# Patient Record
Sex: MALE | Race: WHITE | NOT HISPANIC OR LATINO | URBAN - METROPOLITAN AREA
[De-identification: names, ages, dates, MRNs, and addresses within clinical notes are randomized per-mention and may not be internally consistent; named-entity substitution may affect disease eponyms.]

---

## 2022-06-15 ENCOUNTER — EMERGENCY (EMERGENCY)
Facility: HOSPITAL | Age: 72
LOS: 1 days | Discharge: HOME | End: 2022-06-15
Attending: EMERGENCY MEDICINE | Admitting: EMERGENCY MEDICINE
Payer: MEDICARE

## 2022-06-15 VITALS
OXYGEN SATURATION: 97 % | HEIGHT: 72 IN | WEIGHT: 220.02 LBS | DIASTOLIC BLOOD PRESSURE: 83 MMHG | SYSTOLIC BLOOD PRESSURE: 125 MMHG | HEART RATE: 91 BPM | RESPIRATION RATE: 18 BRPM | TEMPERATURE: 97 F

## 2022-06-15 VITALS
OXYGEN SATURATION: 98 % | RESPIRATION RATE: 16 BRPM | SYSTOLIC BLOOD PRESSURE: 124 MMHG | TEMPERATURE: 98 F | HEART RATE: 88 BPM | DIASTOLIC BLOOD PRESSURE: 74 MMHG

## 2022-06-15 DIAGNOSIS — Z98.89 OTHER SPECIFIED POSTPROCEDURAL STATES: Chronic | ICD-10-CM

## 2022-06-15 PROCEDURE — 99284 EMERGENCY DEPT VISIT MOD MDM: CPT | Mod: FS

## 2022-06-15 PROCEDURE — 73610 X-RAY EXAM OF ANKLE: CPT | Mod: 26,RT

## 2022-06-15 PROCEDURE — 99284 EMERGENCY DEPT VISIT MOD MDM: CPT | Mod: 25

## 2022-06-15 PROCEDURE — 73610 X-RAY EXAM OF ANKLE: CPT

## 2022-06-15 PROCEDURE — 73590 X-RAY EXAM OF LOWER LEG: CPT

## 2022-06-15 PROCEDURE — 73590 X-RAY EXAM OF LOWER LEG: CPT | Mod: 26,RT

## 2022-06-15 NOTE — ED PROVIDER NOTE - PATIENT PORTAL LINK FT
You can access the FollowMyHealth Patient Portal offered by [facility] by registering at the following website: [email or web address]. By joining [facility]’s FollowMyHealth portal, you will also be able to view your health information using other applications (apps) compatible with our system.

## 2022-06-15 NOTE — ED PROVIDER NOTE - NSFOLLOWUPINSTRUCTIONS_ED_ALL_ED_FT
Fracture    A fracture is a break in one of your bones. This can occur from a variety of injuries, especially traumatic ones. Symptoms include pain, bruising, or swelling. Do not use the injured limb. If a fracture is in one of the bones below your waist, do not put weight on that limb unless instructed to do so by your healthcare provider. Crutches or a cane may have been provided. A splint or cast may have been applied by your health care provider. Make sure to keep it dry and follow up with an orthopedist as instructed.    SEEK IMMEDIATE MEDICAL CARE IF YOU HAVE ANY OF THE FOLLOWING SYMPTOMS: numbness, tingling, increasing pain, or weakness in any part of the injured limb.      1. TAKE ALL MEDICATIONS AS DIRECTED. REST APPLY ICE AS NEEDED. ELEVATE EXTREMITY.   FOR PAIN YOU CAN TAKE IBUPROFEN (MOTRIN, ADVIL) OR ACETAMINOPHEN (TYLENOL) AS NEEDED, AS DIRECTED ON PACKAGING.  2. FOLLOW UP WITH __________ AS DIRECTED.  YOU WERE GIVEN COPIES OF ALL LABS AND IMAGING RESULTS FROM YOUR ER VISIT--PLEASE TAKE THEM WITH YOU TO YOUR APPOINTMENT.  3. IF NEEDED, CALL [phone] TO FIND A PRIMARY CARE PHYSICIAN.  OR CALL [phone] TO MAKE AN APPOINTMENT WITH THE MEDICAL CLINIC.  4. RETURN TO THE ER FOR ANY WORSENING SYMPTOMS.

## 2022-06-15 NOTE — ED ADULT NURSE NOTE - NSIMPLEMENTINTERV_GEN_ALL_ED
Implemented All Fall Risk Interventions:  [place] to call system. Call bell, personal items and telephone within reach. Instruct patient to call for assistance. Room bathroom lighting operational. Non-slip footwear when patient is off stretcher. Physically safe environment: no spills, clutter or unnecessary equipment. Stretcher in lowest position, wheels locked, appropriate side rails in place. Provide visual cue, wrist band, yellow gown, etc. Monitor gait and stability. Monitor for mental status changes and reorient to person, place, and time. Review medications for side effects contributing to fall risk. Reinforce activity limits and safety measures with patient and family.

## 2022-06-15 NOTE — ED PROVIDER NOTE - CARE PROVIDER_API CALL
Logan Caal  ORTHOPAEDIC SURGERY  [address] [place] [address]  Phone: [phone]  Fax: [phone]  Follow Up Time: 1-3 Days

## 2022-06-15 NOTE — ED PROVIDER NOTE - NSICDXPASTSURGICALHX_GEN_ALL_CORE_FT
PAST SURGICAL HISTORY:  S/P arthroscopic knee surgery     S/P ORIF (open reduction internal fixation) fracture

## 2022-06-15 NOTE — ED PROVIDER NOTE - CLINICAL SUMMARY MEDICAL DECISION MAKING FREE TEXT BOX
71 yo white male with twisting injury to right ankle earlier today and here now c/o pain and swelling right ankle. This case will require evaluation as well as imaging.

## 2022-06-15 NOTE — ED PROVIDER NOTE - PHYSICAL EXAMINATION
PE:   GEN: Awake, alert, interactive, NAD, non-toxic appearing.   HEAD: Atraumatic  NEURO: AOx3, CN II-XII grossly intact without focal deficit.   MSK: R ankle with swelling to medial aspect. TTP medial malleolus, nontender foot and toes, nontender tib/fib   SKIN: Warm, dry, normal color, without apparent rashes., superficial abrasion to medial R ankle

## 2022-06-15 NOTE — ED PROVIDER NOTE - NS ED ATTENDING STATEMENT MOD
This was a shared visit with the MARILIN. I reviewed and verified the documentation and independently performed the documented:

## 2022-06-15 NOTE — ED PROVIDER NOTE - ATTENDING APP SHARED VISIT CONTRIBUTION OF CARE
Exam revealed white male in NAD with mild tenderness to palpation bi-malleolar on RLE with intact N/V RLE. I agree with plan and management outlined by PA.

## 2022-06-15 NOTE — ED PROVIDER NOTE - OBJECTIVE STATEMENT
73 y/o M with PMH HTN presents to ED for c/o R ankle pain s/p trip and fall today. states was working at a [employer] house going down steep stairs when he lost his footing and fell. Did not hit head of lose consciousness. Reports R ankle pain. Has been walking but with a lot of pain. Denies neck or back pain or any other injury.

## 2022-10-01 ENCOUNTER — INPATIENT (INPATIENT)
Facility: HOSPITAL | Age: 72
LOS: 1 days | Discharge: HOME | DRG: 378 | End: 2022-10-03
Attending: HOSPITALIST | Admitting: STUDENT IN AN ORGANIZED HEALTH CARE EDUCATION/TRAINING PROGRAM
Payer: COMMERCIAL

## 2022-10-01 VITALS
TEMPERATURE: 98 F | RESPIRATION RATE: 20 BRPM | WEIGHT: 225.09 LBS | SYSTOLIC BLOOD PRESSURE: 91 MMHG | HEIGHT: 71 IN | HEART RATE: 102 BPM | OXYGEN SATURATION: 98 % | DIASTOLIC BLOOD PRESSURE: 59 MMHG

## 2022-10-01 DIAGNOSIS — Z98.89 OTHER SPECIFIED POSTPROCEDURAL STATES: Chronic | ICD-10-CM

## 2022-10-01 LAB
APTT BLD: 24.5 SEC — LOW (ref 27.5–35.5)
BASOPHILS # BLD AUTO: 0.01 K/UL — SIGNIFICANT CHANGE UP (ref 0–0.2)
BASOPHILS NFR BLD AUTO: 0.1 % — SIGNIFICANT CHANGE UP (ref 0–2)
EOSINOPHIL # BLD AUTO: 0.05 K/UL — SIGNIFICANT CHANGE UP (ref 0–0.5)
EOSINOPHIL NFR BLD AUTO: 0.6 % — SIGNIFICANT CHANGE UP (ref 0–6)
HCT VFR BLD CALC: 31.9 % — LOW (ref 39–50)
HGB BLD-MCNC: 10.5 G/DL — LOW (ref 13–17)
IMM GRANULOCYTES NFR BLD AUTO: 0.4 % — SIGNIFICANT CHANGE UP (ref 0–0.9)
INR BLD: 1.15 RATIO — SIGNIFICANT CHANGE UP (ref 0.88–1.16)
LIDOCAIN IGE QN: 94 U/L — SIGNIFICANT CHANGE UP (ref 73–393)
LYMPHOCYTES # BLD AUTO: 2.87 K/UL — SIGNIFICANT CHANGE UP (ref 1–3.3)
LYMPHOCYTES # BLD AUTO: 31.7 % — SIGNIFICANT CHANGE UP (ref 13–44)
MCHC RBC-ENTMCNC: 31.5 PG — SIGNIFICANT CHANGE UP (ref 27–34)
MCHC RBC-ENTMCNC: 32.9 GM/DL — SIGNIFICANT CHANGE UP (ref 32–36)
MCV RBC AUTO: 95.8 FL — SIGNIFICANT CHANGE UP (ref 80–100)
MONOCYTES # BLD AUTO: 0.46 K/UL — SIGNIFICANT CHANGE UP (ref 0–0.9)
MONOCYTES NFR BLD AUTO: 5.1 % — SIGNIFICANT CHANGE UP (ref 2–14)
NEUTROPHILS # BLD AUTO: 5.61 K/UL — SIGNIFICANT CHANGE UP (ref 1.8–7.4)
NEUTROPHILS NFR BLD AUTO: 62.1 % — SIGNIFICANT CHANGE UP (ref 43–77)
NRBC # BLD: 0 /100 WBCS — SIGNIFICANT CHANGE UP (ref 0–0)
PLATELET # BLD AUTO: 195 K/UL — SIGNIFICANT CHANGE UP (ref 150–400)
PROTHROM AB SERPL-ACNC: 13.5 SEC — HIGH (ref 10.5–13.4)
RBC # BLD: 3.33 M/UL — LOW (ref 4.2–5.8)
RBC # FLD: 12.9 % — SIGNIFICANT CHANGE UP (ref 10.3–14.5)
WBC # BLD: 9.04 K/UL — SIGNIFICANT CHANGE UP (ref 3.8–10.5)
WBC # FLD AUTO: 9.04 K/UL — SIGNIFICANT CHANGE UP (ref 3.8–10.5)

## 2022-10-01 PROCEDURE — 99285 EMERGENCY DEPT VISIT HI MDM: CPT

## 2022-10-01 PROCEDURE — 93010 ELECTROCARDIOGRAM REPORT: CPT

## 2022-10-01 RX ORDER — ONDANSETRON 8 MG/1
4 TABLET, FILM COATED ORAL ONCE
Refills: 0 | Status: COMPLETED | OUTPATIENT
Start: 2022-10-01 | End: 2022-10-01

## 2022-10-01 RX ORDER — PANTOPRAZOLE SODIUM 20 MG/1
40 TABLET, DELAYED RELEASE ORAL ONCE
Refills: 0 | Status: COMPLETED | OUTPATIENT
Start: 2022-10-01 | End: 2022-10-01

## 2022-10-01 RX ORDER — SODIUM CHLORIDE 9 MG/ML
1000 INJECTION INTRAMUSCULAR; INTRAVENOUS; SUBCUTANEOUS ONCE
Refills: 0 | Status: COMPLETED | OUTPATIENT
Start: 2022-10-01 | End: 2022-10-01

## 2022-10-01 RX ADMIN — SODIUM CHLORIDE 1000 MILLILITER(S): 9 INJECTION INTRAMUSCULAR; INTRAVENOUS; SUBCUTANEOUS at 23:25

## 2022-10-01 RX ADMIN — PANTOPRAZOLE SODIUM 40 MILLIGRAM(S): 20 TABLET, DELAYED RELEASE ORAL at 23:28

## 2022-10-01 RX ADMIN — ONDANSETRON 4 MILLIGRAM(S): 8 TABLET, FILM COATED ORAL at 23:28

## 2022-10-01 NOTE — ED PROVIDER NOTE - CONSTITUTIONAL, MLM
normal... Well appearing, awake, alert, oriented to person, place, time/situation and in mild apparent distress due to nausea.

## 2022-10-01 NOTE — ED ADULT TRIAGE NOTE - CCCP TRG CHIEF CMPLNT
Pt received semisupine in bed in NAD. + oxygen via nasal cannula. + PEG tube.  and private aides at bedside. Per RN Shalom, pt okay to participate in OT evaluation.
syncope at home/bloody vomitus

## 2022-10-01 NOTE — ED ADULT NURSE NOTE - OBJECTIVE STATEMENT
pt AOx4 stating he was at home when he was walking after going to the bathroom and passed out. pt states he is nauseous and has been vomiting blood. pt denies abdominal pain. pt nauseous at this time not actively vomiting. abdomen is soft, nontender. peripheral IV inserted to R AC #18, IV patent. pt tolerated well. pt medicated as ordered. safety measures initiated. call bell within reach. will continue to monitor. RASHI RN

## 2022-10-01 NOTE — ED PROVIDER NOTE - OBJECTIVE STATEMENT
72-year-old male with a history of HTN, high cholesterol presents with reportedly went to a diner last night and had a hamburger.  Patient reportedly was not feeling well, with slight stomach upset that evening afterwards.  This morning patient was continuing to feel some slight stomach upset, although not having any acute abdominal pain.  Patient with some nausea throughout the day, started vomiting around 1 hour prior to arrival.  Patient with dark vomitus and bloody vomitus per family.  No fevers or chills.  No lower abdominal pain.  No neck or back pain.  No cough/URI.  Patient fully vaccinated for COVID.  No recent exposures.  No anticoagulation use.  No prior episodes of same.  No known sick contacts.  No other acute complaints. 72-year-old male with a history of HTN, high cholesterol presents with reportedly went to a diner last night and had a hamburger.  Patient reportedly was not feeling well, with slight stomach upset that evening afterwards.  This morning patient was continuing to feel some slight stomach upset, although not having any acute abdominal pain.  Patient with some nausea throughout the day, started vomiting around 1 hour prior to arrival.  Patient with dark vomitus and bloody vomitus per family. Around 30 minutes prior to arrival, patient became lightheaded and fell to the ground with reported short LOC. Patient denies injury from the fall, possibly hit head. No fevers or chills.  No lower abdominal pain.  No neck or back pain.  No cough/URI.  Patient fully vaccinated for COVID.  No recent exposures.  No anticoagulation use.  No prior episodes of same.  No known sick contacts.  No other acute complaints.

## 2022-10-01 NOTE — ED PROVIDER NOTE - ENMT, MLM
Airway patent, Nasal mucosa clear. Mouth with normal mucosa. Throat has no vesicles, no oropharyngeal exudates and uvula is midline. neck supple. no meningeal signs,.

## 2022-10-01 NOTE — ED PROVIDER NOTE - CLINICAL SUMMARY MEDICAL DECISION MAKING FREE TEXT BOX
Nausea and vomiting this evening, associated with dark vomitus and bloody vomitus with no history of anticoagulant use.  Questionable food poisoning from dinner he ate last night.  Check labs, meds, GI, admission.

## 2022-10-02 ENCOUNTER — TRANSCRIPTION ENCOUNTER (OUTPATIENT)
Age: 72
End: 2022-10-02

## 2022-10-02 DIAGNOSIS — E78.5 HYPERLIPIDEMIA, UNSPECIFIED: ICD-10-CM

## 2022-10-02 DIAGNOSIS — K92.2 GASTROINTESTINAL HEMORRHAGE, UNSPECIFIED: ICD-10-CM

## 2022-10-02 DIAGNOSIS — K59.00 CONSTIPATION, UNSPECIFIED: ICD-10-CM

## 2022-10-02 DIAGNOSIS — K92.0 HEMATEMESIS: ICD-10-CM

## 2022-10-02 DIAGNOSIS — Z29.9 ENCOUNTER FOR PROPHYLACTIC MEASURES, UNSPECIFIED: ICD-10-CM

## 2022-10-02 DIAGNOSIS — R55 SYNCOPE AND COLLAPSE: ICD-10-CM

## 2022-10-02 LAB
ALBUMIN SERPL ELPH-MCNC: 3.5 G/DL — SIGNIFICANT CHANGE UP (ref 3.3–5)
ALP SERPL-CCNC: 75 U/L — SIGNIFICANT CHANGE UP (ref 40–120)
ALT FLD-CCNC: 22 U/L — SIGNIFICANT CHANGE UP (ref 12–78)
ANION GAP SERPL CALC-SCNC: 8 MMOL/L — SIGNIFICANT CHANGE UP (ref 5–17)
AST SERPL-CCNC: 17 U/L — SIGNIFICANT CHANGE UP (ref 15–37)
BILIRUB SERPL-MCNC: 0.8 MG/DL — SIGNIFICANT CHANGE UP (ref 0.2–1.2)
BLD GP AB SCN SERPL QL: SIGNIFICANT CHANGE UP
BUN SERPL-MCNC: 50 MG/DL — HIGH (ref 7–23)
CALCIUM SERPL-MCNC: 8.1 MG/DL — LOW (ref 8.5–10.1)
CHLORIDE SERPL-SCNC: 108 MMOL/L — SIGNIFICANT CHANGE UP (ref 96–108)
CO2 SERPL-SCNC: 25 MMOL/L — SIGNIFICANT CHANGE UP (ref 22–31)
CREAT SERPL-MCNC: 0.98 MG/DL — SIGNIFICANT CHANGE UP (ref 0.5–1.3)
EGFR: 82 ML/MIN/1.73M2 — SIGNIFICANT CHANGE UP
GLUCOSE SERPL-MCNC: 187 MG/DL — HIGH (ref 70–99)
HCT VFR BLD CALC: 27 % — LOW (ref 39–50)
HGB BLD-MCNC: 9.1 G/DL — LOW (ref 13–17)
LACTATE SERPL-SCNC: 1.9 MMOL/L — SIGNIFICANT CHANGE UP (ref 0.7–2)
LACTATE SERPL-SCNC: 2.4 MMOL/L — HIGH (ref 0.7–2)
MCHC RBC-ENTMCNC: 32.4 PG — SIGNIFICANT CHANGE UP (ref 27–34)
MCHC RBC-ENTMCNC: 33.7 GM/DL — SIGNIFICANT CHANGE UP (ref 32–36)
MCV RBC AUTO: 96.1 FL — SIGNIFICANT CHANGE UP (ref 80–100)
NRBC # BLD: 0 /100 WBCS — SIGNIFICANT CHANGE UP (ref 0–0)
PLATELET # BLD AUTO: 153 K/UL — SIGNIFICANT CHANGE UP (ref 150–400)
POTASSIUM SERPL-MCNC: 3.8 MMOL/L — SIGNIFICANT CHANGE UP (ref 3.5–5.3)
POTASSIUM SERPL-SCNC: 3.8 MMOL/L — SIGNIFICANT CHANGE UP (ref 3.5–5.3)
PROT SERPL-MCNC: 6.9 G/DL — SIGNIFICANT CHANGE UP (ref 6–8.3)
RBC # BLD: 2.81 M/UL — LOW (ref 4.2–5.8)
RBC # FLD: 13.1 % — SIGNIFICANT CHANGE UP (ref 10.3–14.5)
SARS-COV-2 RNA SPEC QL NAA+PROBE: SIGNIFICANT CHANGE UP
SODIUM SERPL-SCNC: 141 MMOL/L — SIGNIFICANT CHANGE UP (ref 135–145)
TROPONIN I, HIGH SENSITIVITY RESULT: 44.3 NG/L — SIGNIFICANT CHANGE UP
WBC # BLD: 6.51 K/UL — SIGNIFICANT CHANGE UP (ref 3.8–10.5)
WBC # FLD AUTO: 6.51 K/UL — SIGNIFICANT CHANGE UP (ref 3.8–10.5)

## 2022-10-02 PROCEDURE — 99223 1ST HOSP IP/OBS HIGH 75: CPT

## 2022-10-02 PROCEDURE — 74174 CTA ABD&PLVS W/CONTRAST: CPT | Mod: 26,MA

## 2022-10-02 PROCEDURE — 70450 CT HEAD/BRAIN W/O DYE: CPT | Mod: 26,MA

## 2022-10-02 PROCEDURE — 71045 X-RAY EXAM CHEST 1 VIEW: CPT | Mod: 26

## 2022-10-02 RX ORDER — SENNA PLUS 8.6 MG/1
1 TABLET ORAL DAILY
Refills: 0 | Status: DISCONTINUED | OUTPATIENT
Start: 2022-10-02 | End: 2022-10-03

## 2022-10-02 RX ORDER — SODIUM CHLORIDE 9 MG/ML
1000 INJECTION INTRAMUSCULAR; INTRAVENOUS; SUBCUTANEOUS
Refills: 0 | Status: DISCONTINUED | OUTPATIENT
Start: 2022-10-02 | End: 2022-10-03

## 2022-10-02 RX ORDER — ACETAMINOPHEN 500 MG
650 TABLET ORAL EVERY 6 HOURS
Refills: 0 | Status: DISCONTINUED | OUTPATIENT
Start: 2022-10-02 | End: 2022-10-03

## 2022-10-02 RX ORDER — LANOLIN ALCOHOL/MO/W.PET/CERES
3 CREAM (GRAM) TOPICAL AT BEDTIME
Refills: 0 | Status: DISCONTINUED | OUTPATIENT
Start: 2022-10-02 | End: 2022-10-03

## 2022-10-02 RX ORDER — INFLUENZA VIRUS VACCINE 15; 15; 15; 15 UG/.5ML; UG/.5ML; UG/.5ML; UG/.5ML
0.7 SUSPENSION INTRAMUSCULAR ONCE
Refills: 0 | Status: DISCONTINUED | OUTPATIENT
Start: 2022-10-02 | End: 2022-10-03

## 2022-10-02 RX ORDER — POLYETHYLENE GLYCOL 3350 17 G/17G
17 POWDER, FOR SOLUTION ORAL DAILY
Refills: 0 | Status: DISCONTINUED | OUTPATIENT
Start: 2022-10-02 | End: 2022-10-03

## 2022-10-02 RX ORDER — SIMVASTATIN 20 MG/1
20 TABLET, FILM COATED ORAL AT BEDTIME
Refills: 0 | Status: DISCONTINUED | OUTPATIENT
Start: 2022-10-02 | End: 2022-10-03

## 2022-10-02 RX ORDER — PANTOPRAZOLE SODIUM 20 MG/1
40 TABLET, DELAYED RELEASE ORAL DAILY
Refills: 0 | Status: DISCONTINUED | OUTPATIENT
Start: 2022-10-02 | End: 2022-10-03

## 2022-10-02 RX ORDER — EZETIMIBE AND SIMVASTATIN 10; 80 MG/1; MG/1
1 TABLET, FILM COATED ORAL
Qty: 0 | Refills: 0 | DISCHARGE

## 2022-10-02 RX ORDER — ONDANSETRON 8 MG/1
4 TABLET, FILM COATED ORAL EVERY 8 HOURS
Refills: 0 | Status: DISCONTINUED | OUTPATIENT
Start: 2022-10-02 | End: 2022-10-03

## 2022-10-02 RX ORDER — SUCRALFATE 1 G
1 TABLET ORAL
Refills: 0 | Status: DISCONTINUED | OUTPATIENT
Start: 2022-10-02 | End: 2022-10-03

## 2022-10-02 RX ADMIN — Medication 1 GRAM(S): at 18:16

## 2022-10-02 RX ADMIN — PANTOPRAZOLE SODIUM 40 MILLIGRAM(S): 20 TABLET, DELAYED RELEASE ORAL at 12:07

## 2022-10-02 RX ADMIN — SIMVASTATIN 20 MILLIGRAM(S): 20 TABLET, FILM COATED ORAL at 23:08

## 2022-10-02 RX ADMIN — SODIUM CHLORIDE 84 MILLILITER(S): 9 INJECTION INTRAMUSCULAR; INTRAVENOUS; SUBCUTANEOUS at 05:04

## 2022-10-02 RX ADMIN — SODIUM CHLORIDE 1000 MILLILITER(S): 9 INJECTION INTRAMUSCULAR; INTRAVENOUS; SUBCUTANEOUS at 00:55

## 2022-10-02 NOTE — PATIENT PROFILE ADULT - NSTOBACCONEVERSMOKERY/N_GEN_A
Griseofulvin Pregnancy And Lactation Text: This medication is Pregnancy Category X and is known to cause serious birth defects. It is unknown if this medication is excreted in breast milk but breast feeding should be avoided. No

## 2022-10-02 NOTE — H&P ADULT - NSICDXPASTMEDICALHX_GEN_ALL_CORE_FT
PAST MEDICAL HISTORY:  HLD (hyperlipidemia)     Knee torn cartilage, left     Mumps     Right fibular fracture     Scarlet fever

## 2022-10-02 NOTE — H&P ADULT - HISTORY OF PRESENT ILLNESS
Patient is a 71 y/o M with PMHx of HLD, anxiety, and constipation who presents to the ED with nausea, abdominal pain, and hematemesis. Patient was in normal state of health until 2 days ago when he began having abdominal pain and nausea after an omelet he ate at a diner. No one else who ate at the restaurant is experiencing similar symptoms. The symptoms persisted into the following morning, but patient also noticed weakness and disorientation. He reports falling from a standing position twice yesterday. The first episode occurred on his way to the bathroom. He reports losing his balance, but denies dizziness, warmth, or loss of consciousness prior to the episode. He was able to return to his feet shortly afterwards. Later in the same day, patient lost consciousness after several episodes of hematemesis in the bathroom while standing. He was awakened by his wife's home aide. He has little recollection of the second event, but denies pre-syncopal symptoms or an aura prior to losing consciousness. The home aide did not report seeing seizure-like activity. Patient had 2 more episodes of hematemesis in the car ride to the hospital and in the ED. This has never happened before. Patient reports increased stress over the past 2 months, following his wife's stroke, and reports increased abdominal distress when he becomes emotional. On several occasions in the past 2 years, patient has had "anxiety attacks" described as tearfulness. Currently denies CP, SOB, abd pain, headache, leg pain, leg weakness.    ED Course  Vitals: /70, HR 94, RR 16, SpO2 100% RA, T 98.3  Labs: Hgb 10.5, Hct 31.9, Lactate 2.4 --> 1.9, BUN 50, Glu 187  Imaging: CT H- no acute infarctions, CT A/P  - No colitis. No evidence of gastrointestinal bleeding. Diverticulosis w/o bleed  ECG: NSR    Given NS bolus, ondansetron, protonix

## 2022-10-02 NOTE — H&P ADULT - ATTENDING COMMENTS
72M PMHx of HLD, anxiety, and constipation who presents to the ED with nausea, abdominal pain, and hematemesis. Will keep pt NPO for now, will start Protonix, Zofran PRN for N/V, pt has not had any further hematemesis episodes. Will consult GI to determine if a scope is warranted at this time. Advanced diet as tolerated aleena.

## 2022-10-02 NOTE — H&P ADULT - NSHPPHYSICALEXAM_GEN_ALL_CORE
T(C): 36.8 (10-01-22 @ 22:56), Max: 36.8 (10-01-22 @ 22:56)  HR: 94 (10-01-22 @ 23:36) (94 - 102)  BP: 106/70 (10-01-22 @ 23:36) (91/59 - 106/70)  RR: 16 (10-01-22 @ 23:36) (16 - 20)  SpO2: 100% (10-01-22 @ 23:36) (98% - 100%)    GENERAL: patient appears well, no acute distress, appropriate, pleasant  EYES: sclera clear, no exudates  ENMT: oropharynx clear without erythema, no exudates, moist mucous membranes  NECK: supple, soft, no thyromegaly noted  LUNGS: good air entry bilaterally, clear to auscultation, symmetric breath sounds, no wheezing or rhonchi appreciated  HEART: soft S1/S2, regular rate and rhythm, no murmurs noted, no lower extremity edema  GASTROINTESTINAL: abdomen is soft, nontender, nondistended, normoactive bowel sounds, no palpable masses  INTEGUMENT: good skin turgor, no lesions noted  MUSCULOSKELETAL: no clubbing or cyanosis, no obvious deformity  NEUROLOGIC: awake, alert, oriented x3, good muscle tone in 4 extremities, no obvious sensory deficits  PSYCHIATRIC: +mildly anxious, affect is congruent, linear and logical thought process  HEME/LYMPH: no palpable supraclavicular nodules, no obvious ecchymosis or petechiae

## 2022-10-02 NOTE — H&P ADULT - PROBLEM SELECTOR PLAN 1
Patient presented with nausea, abd pain, hematemesis, and syncope likely 2/2 to peptic ulcer disease    - Patient presented with nausea, abd pain, hematemesis, and syncope likely 2/2 to peptic ulcer disease    - Admit to general medicine floor   - CTA A/P: Diverticulosis descending and sigmoid portions the colon without acute diverticulitis. No colitis. No evidence of gastrointestinal bleeding   - Lactate 2.4 --> 1.9; BUN 50   - NPO except for meds   - Protonix, Zofran    - IVF NS    - f/u AM CBC   - T+S completed    - Transfuse as necessary for Hgb <7   - GI (Dr. Valencia) consulted, observe recs Patient presented with nausea, abd pain, hematemesis, and syncope likely 2/2 to peptic ulcer disease    - Admit to general medicine floor   - CTA A/P: Diverticulosis descending and sigmoid portions the colon without acute diverticulitis. No colitis. No evidence of gastrointestinal bleeding   - CXR: personal read, likely atelectasis in R lung. No signs of acute pathology. F/u formal read    - Lactate 2.4 --> 1.9; BUN 50   - NPO except for meds   - Protonix, Zofran    - IVF NS    - f/u AM CBC, CMP   - T+S completed    - Transfuse as necessary for Hgb <7   - GI (Dr. Valencia) consulted, observe recs

## 2022-10-02 NOTE — PHYSICAL THERAPY INITIAL EVALUATION ADULT - PERTINENT HX OF CURRENT PROBLEM, REHAB EVAL
Patient is a 71 y/o M with PMHx of HLD, anxiety, and constipation who presents to the ED with nausea, abdominal pain, and hematemesis. Patient was in normal state of health until 2 days ago when he began having abdominal pain and nausea after an omelet he ate at a diner. No one else who ate at the restaurant is experiencing similar symptoms. The symptoms persisted into the following morning, but patient also noticed weakness and disorientation. He reports falling from a standing position twice yesterday. The first episode occurred on his way to the bathroom. He reports losing his balance, but denies dizziness, warmth, or loss of consciousness prior to the episode. He was able to return to his feet shortly afterwards. Later in the same day, patient lost consciousness after several episodes of hematemesis in the bathroom while standing. He was awakened by his wife's home aide. Patient will be admitted for upper GI bleed.

## 2022-10-02 NOTE — PHYSICAL THERAPY INITIAL EVALUATION ADULT - ADDITIONAL COMMENTS
Pt reports prior to admission he was independent with ADL's and had even returned to the gym 3 weeks ago, swimming 5x/week. Pt lives in split level home with stairs to enter.

## 2022-10-02 NOTE — H&P ADULT - NSHPREVIEWOFSYSTEMS_GEN_ALL_CORE
CONSTITUTIONAL: denies fever, chills, fatigue, weakness  HEENT: denies blurred vision, sore throat  SKIN: denies new lesions, rash  CARDIOVASCULAR: denies chest pain, chest pressure, palpitations  RESPIRATORY: denies shortness of breath, sputum production  GASTROINTESTINAL: + nausea, +constipation, denies vomiting, diarrhea, abdominal pain  GENITOURINARY: denies dysuria, discharge  NEUROLOGICAL: denies numbness, headache, focal weakness  MUSCULOSKELETAL: denies new joint pain, muscle aches  HEMATOLOGIC: denies gross bleeding, bruising  LYMPHATICS: denies enlarged lymph nodes, extremity swelling  PSYCHIATRIC: +anxiety, denies depression  ENDOCRINOLOGIC: denies sweating, cold or heat intolerance

## 2022-10-02 NOTE — PATIENT PROFILE ADULT - FALL HARM RISK - HARM RISK INTERVENTIONS

## 2022-10-02 NOTE — PATIENT PROFILE ADULT - VISION (WITH CORRECTIVE LENSES IF THE PATIENT USUALLY WEARS THEM):
Normal vision: sees adequately in most situations; can see medication labels, newsprint left glasses at home/Partially impaired: cannot see medication labels or newsprint, but can see obstacles in path, and the surrounding layout; can count fingers at arm's length

## 2022-10-02 NOTE — CONSULT NOTE ADULT - SUBJECTIVE AND OBJECTIVE BOX
Chief Complaint:  Patient is a 72y old  Male who presents with a chief complaint of Upper GI Bleed   Patient is a 73 y/o M with PMHx of HLD, anxiety, and constipation who presents to the ED with nausea, abdominal pain, and hematemesis. Patient was in normal state of health until 2 days ago when he began having abdominal pain and nausea after an omelet he ate at a diner. No one else who ate at the restaurant is experiencing similar symptoms. The symptoms persisted into the following morning, but patient also noticed weakness and disorientation. He reports falling from a standing position twice yesterday. The first episode occurred on his way to the bathroom. He reports losing his balance, but denies dizziness, warmth, or loss of consciousness prior to the episode. He was able to return to his feet shortly afterwards. Later in the same day, patient lost consciousness after several episodes of hematemesis in the bathroom while standing. He was awakened by his wife's home aide. He has little recollection of the second event, but denies pre-syncopal symptoms or an aura prior to losing consciousness. The home aide did not report seeing seizure-like activity. Patient had 2 more episodes of hematemesis in the car ride to the hospital and in the ED. This has never happened before. Patient reports increased stress over the past 2 months, following his wife's stroke, and reports increased abdominal distress when he becomes emotional. On several occasions in the past 2 years, patient has had "anxiety attacks" described as tearfulness. Currently denies CP, SOB, abd pain, headache, leg pain, leg weakness.    ED Course  Vitals: /70, HR 94, RR 16, SpO2 100% RA, T 98.3  Labs: Hgb 10.5, Hct 31.9, Lactate 2.4 --> 1.9, BUN 50, Glu 187  Imaging: CT H- no acute infarctions, CT A/P  - No colitis. No evidence of gastrointestinal bleeding. Diverticulosis w/o bleed  ECG: NSR    Given NS bolus, ondansetron, protonix      Review of Systems:  Review of Systems: CONSTITUTIONAL: denies fever, chills, fatigue, weakness  HEENT: denies blurred vision, sore throat  SKIN: denies new lesions, rash  CARDIOVASCULAR: denies chest pain, chest pressure, palpitations  RESPIRATORY: denies shortness of breath, sputum production  GASTROINTESTINAL: + nausea, +constipation, denies vomiting, diarrhea, abdominal pain  GENITOURINARY: denies dysuria, discharge  NEUROLOGICAL: denies numbness, headache, focal weakness  MUSCULOSKELETAL: denies new joint pain, muscle aches  HEMATOLOGIC: denies gross bleeding, bruising  LYMPHATICS: denies enlarged lymph nodes, extremity swelling  PSYCHIATRIC: +anxiety, denies depression  ENDOCRINOLOGIC: denies sweating, cold or heat intolerance    Allergies:  latex (Rash)  No Known Drug Allergies      Medications:  acetaminophen     Tablet .. 650 milliGRAM(s) Oral every 6 hours PRN  aluminum hydroxide/magnesium hydroxide/simethicone Suspension 30 milliLiter(s) Oral every 4 hours PRN  influenza  Vaccine (HIGH DOSE) 0.7 milliLiter(s) IntraMuscular once  melatonin 3 milliGRAM(s) Oral at bedtime PRN  ondansetron Injectable 4 milliGRAM(s) IV Push every 8 hours PRN  pantoprazole   Suspension 40 milliGRAM(s) Oral daily  polyethylene glycol 3350 17 Gram(s) Oral daily PRN  senna 1 Tablet(s) Oral daily PRN  simvastatin 20 milliGRAM(s) Oral at bedtime  sodium chloride 0.9%. 1000 milliLiter(s) IV Continuous <Continuous>  sucralfate 1 Gram(s) Oral two times a day      PMHX/PSHX:  Hypertension    Scarlet fever    Mumps    Knee torn cartilage, left    HLD (hyperlipidemia)    Right fibular fracture    S/P arthroscopic knee surgery    S/P ORIF (open reduction internal fixation) fracture        Family history:    no uc no cd    Social History:   no ivda no prbc  ROS:     General:  No wt loss, fevers, chills, night sweats, fatigue,   Eyes:  Good vision, no reported pain  ENT:  No sore throat, pain, runny nose, dysphagia  CV:  No pain, palpitations, hypo/hypertension  Resp:  No dyspnea, cough, tachypnea, wheezing  GI:  No pain, No nausea, No vomiting, No diarrhea, No constipation, No weight loss, No fever, No pruritis, No rectal bleeding, No tarry stools, No dysphagia,  :  No pain, bleeding, incontinence, nocturia  Muscle:  No pain, weakness  Neuro:  No weakness, tingling, memory problems  Psych:  No fatigue, insomnia, mood problems, depression  Endocrine:  No polyuria, polydipsia, cold/heat intolerance  Heme:  No petechiae, ecchymosis, easy bruisability  Skin:  No rash, tattoos, scars, edema      PHYSICAL EXAM:   Vital Signs:  Vital Signs Last 24 Hrs  T(C): 37.3 (02 Oct 2022 05:00), Max: 37.3 (02 Oct 2022 05:00)  T(F): 99.2 (02 Oct 2022 05:00), Max: 99.2 (02 Oct 2022 05:00)  HR: 101 (02 Oct 2022 05:00) (94 - 102)  BP: 946/- (02 Oct 2022 05:00) (91/59 - 946/-)  BP(mean): --  RR: 18 (02 Oct 2022 05:00) (15 - 20)  SpO2: 96% (02 Oct 2022 05:00) (96% - 100%)    Parameters below as of 02 Oct 2022 05:00  Patient On (Oxygen Delivery Method): room air      Daily Height in cm: 180.34 (01 Oct 2022 22:56)    Daily Weight in k.1 (02 Oct 2022 05:00)    GENERAL:  Appears stated age, well-groomed, well-nourished, no distress  HEENT:  NC/AT,  conjunctivae clear and pink, no thyromegaly, nodules, adenopathy, no JVD, sclera -anicteric  CHEST:  Full & symmetric excursion, no increased effort, breath sounds clear  HEART:  Regular rhythm, S1, S2, no murmur/rub/S3/S4, no abdominal bruit, no edema  ABDOMEN:  Soft, non-tender, non-distended, normoactive bowel sounds,  no masses ,no hepato-splenomegaly, no signs of chronic liver disease  EXTEREMITIES:  no cyanosis,clubbing or edema  SKIN:  No rash/erythema/ecchymoses/petechiae/wounds/abscess/warm/dry  NEURO:  Alert, oriented, no asterixis, no tremor, no encephalopathy    LABS:                        9.1    6.51  )-----------( 153      ( 02 Oct 2022 10:32 )             27.0     10-    141  |  108  |  50<H>  ----------------------------<  187<H>  3.8   |  25  |  0.98    Ca    8.1<L>      01 Oct 2022 23:35    TPro  6.9  /  Alb  3.5  /  TBili  0.8  /  DBili  x   /  AST  17  /  ALT  22  /  AlkPhos  75  10-01    LIVER FUNCTIONS - ( 01 Oct 2022 23:35 )  Alb: 3.5 g/dL / Pro: 6.9 g/dL / ALK PHOS: 75 U/L / ALT: 22 U/L / AST: 17 U/L / GGT: x           PT/INR - ( 01 Oct 2022 23:35 )   PT: 13.5 sec;   INR: 1.15 ratio         PTT - ( 01 Oct 2022 23:35 )  PTT:24.5 sec    Amylase Serum--      Lipase serum94       Ammonia--      Imaging:

## 2022-10-02 NOTE — PATIENT PROFILE ADULT - CONTRAINDICATIONS & PRECAUTIONS (SELECT ALL THAT APPLY)
Enbrel Counseling:  I discussed with the patient the risks of etanercept including but not limited to myelosuppression, immunosuppression, autoimmune hepatitis, demyelinating diseases, lymphoma, and infections.  The patient understands that monitoring is required including a PPD at baseline and must alert us or the primary physician if symptoms of infection or other concerning signs are noted. none...

## 2022-10-02 NOTE — H&P ADULT - PROBLEM SELECTOR PLAN 3
Irregular BMs, likely 2/2 to undiagnosed irritable bowel disease given assoc with increased emotions   - NPO, except for meds   - PRN senna and miralax   - Will need outpatient Psych referral

## 2022-10-02 NOTE — CHART NOTE - NSCHARTNOTEFT_GEN_A_CORE
Patient seen and chart reviewed.    Patient was admitted for     Vital Signs Last 24 Hrs  T(C): 37.3 (02 Oct 2022 05:00), Max: 37.3 (02 Oct 2022 05:00)  T(F): 99.2 (02 Oct 2022 05:00), Max: 99.2 (02 Oct 2022 05:00)  HR: 101 (02 Oct 2022 05:00) (94 - 102)    RR: 18 (02 Oct 2022 05:00) (15 - 20)  SpO2: 96% (02 Oct 2022 05:00) (96% - 100%)    Parameters below as of 02 Oct 2022 05:00  Patient On (Oxygen Delivery Method): room air          LABS:                        9.1    6.51  )-----------( 153      ( 02 Oct 2022 10:32 )             27.0     10-01    141  |  108  |  50<H>  ----------------------------<  187<H>  3.8   |  25  |  0.98    Ca    8.1<L>      01 Oct 2022 23:35    TPro  6.9  /  Alb  3.5  /  TBili  0.8  /  DBili  x   /  AST  17  /  ALT  22  /  AlkPhos  75  10-01        Met with patient- the current diagnosis, diagnostic/therapeutic options and plan of care were discussed in details.  All questions and concerns were addressed. Patient seen and chart reviewed.    Patient was admitted for GI bleed    Vital Signs Last 24 Hrs  T(C): 37.3 (02 Oct 2022 05:00), Max: 37.3 (02 Oct 2022 05:00)  T(F): 99.2 (02 Oct 2022 05:00), Max: 99.2 (02 Oct 2022 05:00)  HR: 101 (02 Oct 2022 05:00) (94 - 102)    RR: 18 (02 Oct 2022 05:00) (15 - 20)  SpO2: 96% (02 Oct 2022 05:00) (96% - 100%)    Parameters below as of 02 Oct 2022 05:00  Patient On (Oxygen Delivery Method): room air          LABS:                        9.1    6.51  )-----------( 153      ( 02 Oct 2022 10:32 )             27.0     10-01    141  |  108  |  50<H>  ----------------------------<  187<H>  3.8   |  25  |  0.98    Ca    8.1<L>      01 Oct 2022 23:35    TPro  6.9  /  Alb  3.5  /  TBili  0.8  /  DBili  x   /  AST  17  /  ALT  22  /  AlkPhos  75  10-01        Met with patient- the current diagnosis, diagnostic/therapeutic options and plan of care were discussed in details.  All questions and concerns were addressed.    Discussed with GI attending - Plan for EGD in am

## 2022-10-02 NOTE — H&P ADULT - TIME BILLING
Pt seen and examined at bedside, labs, vitals, and imaging assessed and medical assessment and plan was provided to the pt and family. Pt seen by resident physician as well, agree with assessment and plan.

## 2022-10-02 NOTE — CONSULT NOTE ADULT - ASSESSMENT
anemia  melena    plan  npo after midnight   upper gastrointestinal endoscopy in am  ppi bid and carafate 1bid    Advanced care planning was discussed with patient and family.  Advanced care planning forms were reviewed and discussed.  Risks, benefits and alternatives of gastroenterologic procedures were discussed in detail and all questions were answered.    30 minutes spent.

## 2022-10-02 NOTE — H&P ADULT - NSHPSOCIALHISTORY_GEN_ALL_CORE
EtOH: no alcohol in >20 yrs, no h/o abuse  Tobacco: never  Illicit drugs: never  Lives: w/ wife at home. Wife has home aide  Ambulation: independently   ADLs: independent

## 2022-10-02 NOTE — H&P ADULT - ASSESSMENT
Patient is a 73 y/o M with PMHx of HLD, anxiety, and constipation who presents to the ED with nausea, abdominal pain, and hematemesis. Patient will be admitted for upper GI bleed.

## 2022-10-02 NOTE — ED ADULT NURSE REASSESSMENT NOTE - NS ED NURSE REASSESS COMMENT FT1
0104- pt resting comfortably in stretcher, breathing equal and unlabored talking in full sentences. pt denies pain. pt awaiting results. safety measures maintained. will continue to monitor. RASHI, RN

## 2022-10-02 NOTE — H&P ADULT - PROBLEM SELECTOR PLAN 2
s/p 2 unwitnessed falls, 1 episode of pre-syncope, and 1 episode of LOC likely 2/2 to dehydration from blood loss and decreased PO intake   - No history of seizures, no evidence of seizures with recent falls   - CT H: No acute intracranial abnormalities. Mild small vessel and atrophic changes.   - Cont IV NS

## 2022-10-03 ENCOUNTER — TRANSCRIPTION ENCOUNTER (OUTPATIENT)
Age: 72
End: 2022-10-03

## 2022-10-03 VITALS
RESPIRATION RATE: 18 BRPM | DIASTOLIC BLOOD PRESSURE: 72 MMHG | SYSTOLIC BLOOD PRESSURE: 110 MMHG | HEART RATE: 85 BPM | TEMPERATURE: 99 F | OXYGEN SATURATION: 96 %

## 2022-10-03 LAB
ALBUMIN SERPL ELPH-MCNC: 3.1 G/DL — LOW (ref 3.3–5)
ALP SERPL-CCNC: 62 U/L — SIGNIFICANT CHANGE UP (ref 40–120)
ALT FLD-CCNC: 20 U/L — SIGNIFICANT CHANGE UP (ref 12–78)
ANION GAP SERPL CALC-SCNC: 4 MMOL/L — LOW (ref 5–17)
AST SERPL-CCNC: 23 U/L — SIGNIFICANT CHANGE UP (ref 15–37)
BASOPHILS # BLD AUTO: 0.02 K/UL — SIGNIFICANT CHANGE UP (ref 0–0.2)
BASOPHILS NFR BLD AUTO: 0.3 % — SIGNIFICANT CHANGE UP (ref 0–2)
BILIRUB SERPL-MCNC: 0.8 MG/DL — SIGNIFICANT CHANGE UP (ref 0.2–1.2)
BUN SERPL-MCNC: 20 MG/DL — SIGNIFICANT CHANGE UP (ref 7–23)
CALCIUM SERPL-MCNC: 8.3 MG/DL — LOW (ref 8.5–10.1)
CHLORIDE SERPL-SCNC: 109 MMOL/L — HIGH (ref 96–108)
CHOLEST SERPL-MCNC: 128 MG/DL — SIGNIFICANT CHANGE UP
CO2 SERPL-SCNC: 31 MMOL/L — SIGNIFICANT CHANGE UP (ref 22–31)
CREAT SERPL-MCNC: 0.84 MG/DL — SIGNIFICANT CHANGE UP (ref 0.5–1.3)
EGFR: 93 ML/MIN/1.73M2 — SIGNIFICANT CHANGE UP
EOSINOPHIL # BLD AUTO: 0.12 K/UL — SIGNIFICANT CHANGE UP (ref 0–0.5)
EOSINOPHIL NFR BLD AUTO: 2 % — SIGNIFICANT CHANGE UP (ref 0–6)
FERRITIN SERPL-MCNC: 61 NG/ML — SIGNIFICANT CHANGE UP (ref 30–400)
FOLATE SERPL-MCNC: 12 NG/ML — SIGNIFICANT CHANGE UP
GLUCOSE SERPL-MCNC: 110 MG/DL — HIGH (ref 70–99)
HCT VFR BLD CALC: 24.5 % — LOW (ref 39–50)
HCT VFR BLD CALC: 25.3 % — LOW (ref 39–50)
HCV AB S/CO SERPL IA: 0.07 S/CO — SIGNIFICANT CHANGE UP (ref 0–0.99)
HCV AB SERPL-IMP: SIGNIFICANT CHANGE UP
HDLC SERPL-MCNC: 33 MG/DL — LOW
HGB BLD-MCNC: 8.2 G/DL — LOW (ref 13–17)
HGB BLD-MCNC: 8.3 G/DL — LOW (ref 13–17)
IMM GRANULOCYTES NFR BLD AUTO: 0.3 % — SIGNIFICANT CHANGE UP (ref 0–0.9)
IRON SATN MFR SERPL: 19 % — SIGNIFICANT CHANGE UP (ref 16–55)
IRON SATN MFR SERPL: 56 UG/DL — SIGNIFICANT CHANGE UP (ref 45–165)
LIPID PNL WITH DIRECT LDL SERPL: 64 MG/DL — SIGNIFICANT CHANGE UP
LYMPHOCYTES # BLD AUTO: 2.63 K/UL — SIGNIFICANT CHANGE UP (ref 1–3.3)
LYMPHOCYTES # BLD AUTO: 43 % — SIGNIFICANT CHANGE UP (ref 13–44)
MCHC RBC-ENTMCNC: 31.9 PG — SIGNIFICANT CHANGE UP (ref 27–34)
MCHC RBC-ENTMCNC: 32.4 PG — SIGNIFICANT CHANGE UP (ref 27–34)
MCHC RBC-ENTMCNC: 32.8 GM/DL — SIGNIFICANT CHANGE UP (ref 32–36)
MCHC RBC-ENTMCNC: 33.5 GM/DL — SIGNIFICANT CHANGE UP (ref 32–36)
MCV RBC AUTO: 96.8 FL — SIGNIFICANT CHANGE UP (ref 80–100)
MCV RBC AUTO: 97.3 FL — SIGNIFICANT CHANGE UP (ref 80–100)
MONOCYTES # BLD AUTO: 0.46 K/UL — SIGNIFICANT CHANGE UP (ref 0–0.9)
MONOCYTES NFR BLD AUTO: 7.5 % — SIGNIFICANT CHANGE UP (ref 2–14)
NEUTROPHILS # BLD AUTO: 2.86 K/UL — SIGNIFICANT CHANGE UP (ref 1.8–7.4)
NEUTROPHILS NFR BLD AUTO: 46.9 % — SIGNIFICANT CHANGE UP (ref 43–77)
NON HDL CHOLESTEROL: 95 MG/DL — SIGNIFICANT CHANGE UP
NRBC # BLD: 0 /100 WBCS — SIGNIFICANT CHANGE UP (ref 0–0)
NRBC # BLD: 0 /100 WBCS — SIGNIFICANT CHANGE UP (ref 0–0)
PLATELET # BLD AUTO: 145 K/UL — LOW (ref 150–400)
PLATELET # BLD AUTO: 154 K/UL — SIGNIFICANT CHANGE UP (ref 150–400)
POTASSIUM SERPL-MCNC: 4 MMOL/L — SIGNIFICANT CHANGE UP (ref 3.5–5.3)
POTASSIUM SERPL-SCNC: 4 MMOL/L — SIGNIFICANT CHANGE UP (ref 3.5–5.3)
PROT SERPL-MCNC: 6.1 G/DL — SIGNIFICANT CHANGE UP (ref 6–8.3)
RBC # BLD: 2.53 M/UL — LOW (ref 4.2–5.8)
RBC # BLD: 2.6 M/UL — LOW (ref 4.2–5.8)
RBC # FLD: 13 % — SIGNIFICANT CHANGE UP (ref 10.3–14.5)
RBC # FLD: 13.2 % — SIGNIFICANT CHANGE UP (ref 10.3–14.5)
SODIUM SERPL-SCNC: 144 MMOL/L — SIGNIFICANT CHANGE UP (ref 135–145)
TIBC SERPL-MCNC: 302 UG/DL — SIGNIFICANT CHANGE UP (ref 220–430)
TRIGL SERPL-MCNC: 159 MG/DL — HIGH
UIBC SERPL-MCNC: 245 UG/DL — SIGNIFICANT CHANGE UP (ref 110–370)
VIT B12 SERPL-MCNC: 262 PG/ML — SIGNIFICANT CHANGE UP (ref 232–1245)
WBC # BLD: 5.15 K/UL — SIGNIFICANT CHANGE UP (ref 3.8–10.5)
WBC # BLD: 6.11 K/UL — SIGNIFICANT CHANGE UP (ref 3.8–10.5)
WBC # FLD AUTO: 5.15 K/UL — SIGNIFICANT CHANGE UP (ref 3.8–10.5)
WBC # FLD AUTO: 6.11 K/UL — SIGNIFICANT CHANGE UP (ref 3.8–10.5)

## 2022-10-03 PROCEDURE — 88305 TISSUE EXAM BY PATHOLOGIST: CPT | Mod: 26

## 2022-10-03 PROCEDURE — 88342 IMHCHEM/IMCYTCHM 1ST ANTB: CPT | Mod: 26

## 2022-10-03 PROCEDURE — 88312 SPECIAL STAINS GROUP 1: CPT | Mod: 26

## 2022-10-03 PROCEDURE — 99239 HOSP IP/OBS DSCHRG MGMT >30: CPT

## 2022-10-03 DEVICE — HEMOSPRAY HEMOSTAT ENDO 7F: Type: IMPLANTABLE DEVICE | Status: FUNCTIONAL

## 2022-10-03 DEVICE — ESOPHAGEAL BALLOON CATH CRE FIXED WIRE 12-13.5-15MM: Type: IMPLANTABLE DEVICE | Status: FUNCTIONAL

## 2022-10-03 DEVICE — ESOPHAGEAL BALLOON CATH CRE FIXED WIRE 8-9-10MM: Type: IMPLANTABLE DEVICE | Status: FUNCTIONAL

## 2022-10-03 DEVICE — ESOPHAGEAL BALLOON CATH CRE FIXED WIRE 15-16.5-18MM: Type: IMPLANTABLE DEVICE | Status: FUNCTIONAL

## 2022-10-03 DEVICE — ESOPHAGEAL BALLOON CATH CRE FIXED WIRE 6-7-8MM: Type: IMPLANTABLE DEVICE | Status: FUNCTIONAL

## 2022-10-03 DEVICE — ESOPHAGEAL BALLOON CATH CRE FIXED WIRE 10-11-12MM: Type: IMPLANTABLE DEVICE | Status: FUNCTIONAL

## 2022-10-03 RX ORDER — PANTOPRAZOLE SODIUM 20 MG/1
1 TABLET, DELAYED RELEASE ORAL
Qty: 30 | Refills: 0
Start: 2022-10-03 | End: 2022-11-01

## 2022-10-03 RX ORDER — PANTOPRAZOLE SODIUM 20 MG/1
1 TABLET, DELAYED RELEASE ORAL
Qty: 60 | Refills: 0
Start: 2022-10-03 | End: 2022-11-01

## 2022-10-03 RX ORDER — SENNA PLUS 8.6 MG/1
2 TABLET ORAL
Qty: 0 | Refills: 0 | DISCHARGE

## 2022-10-03 RX ORDER — FERROUS SULFATE 325(65) MG
1 TABLET ORAL
Qty: 30 | Refills: 0
Start: 2022-10-03 | End: 2022-11-01

## 2022-10-03 RX ORDER — IRON SUCROSE 20 MG/ML
100 INJECTION, SOLUTION INTRAVENOUS ONCE
Refills: 0 | Status: COMPLETED | OUTPATIENT
Start: 2022-10-03 | End: 2022-10-03

## 2022-10-03 RX ADMIN — IRON SUCROSE 100 MILLIGRAM(S): 20 INJECTION, SOLUTION INTRAVENOUS at 14:44

## 2022-10-03 RX ADMIN — PANTOPRAZOLE SODIUM 40 MILLIGRAM(S): 20 TABLET, DELAYED RELEASE ORAL at 14:45

## 2022-10-03 RX ADMIN — Medication 1 GRAM(S): at 05:40

## 2022-10-03 NOTE — DISCHARGE NOTE PROVIDER - ATTENDING DISCHARGE PHYSICAL EXAMINATION:
MEDICAL ATTENDING DISCHARGE NOTE :    Patient is a 72y old  Male who presents with a chief complaint of Upper GI Bleed (02 Oct 2022 12:55)      INTERVAL HPI / OVERNIGHT EVENTS: patient with uneventful night and offers no new complaints    ----------------------------------------------------------------------------------  REVIEW OF SYSTEMS: no fever; no SOB      Vital Signs Last 24 Hrs  T(C): 36.8 (03 Oct 2022 04:14), Max: 36.9 (02 Oct 2022 13:08)  T(F): 98.3 (03 Oct 2022 04:14), Max: 98.4 (02 Oct 2022 13:08)  HR: 82 (03 Oct 2022 04:14) (82 - 95)  BP: 117/76 (03 Oct 2022 04:14) (103/58 - 117/76)  BP(mean): --  RR: 20 (03 Oct 2022 04:14) (18 - 20)  SpO2: 97% (03 Oct 2022 04:14) (94% - 97%)    Parameters below as of 03 Oct 2022 04:14  Patient On (Oxygen Delivery Method): room air        _________________  PHYSICAL EXAM:  ---------------------------   NAD; Normocephalic  LUNGS - no wheezing  HEART: S1 S2+   ABDOMEN: Soft, Nontender, non distended  EXTREMITIES: no cyanosis; no edema      _________________________________________________  LABS:                        8.3    6.11  )-----------( 154      ( 03 Oct 2022 06:30 )             25.3     10-03    144  |  109<H>  |  20  ----------------------------<  110<H>  4.0   |  31  |  0.84    Ca    8.3<L>      03 Oct 2022 06:30    TPro  6.1  /  Alb  3.1<L>  /  TBili  0.8  /  DBili  x   /  AST  23  /  ALT  20  /  AlkPhos  62  10-03    PT/INR - ( 01 Oct 2022 23:35 )   PT: 13.5 sec;   INR: 1.15 ratio         PTT - ( 01 Oct 2022 23:35 )  PTT:24.5 sec    A/P:  admitted for GI bleed with acute blood loss anemia-  EGD- findings  noted  Continue PPI  Please follow up with your primary GI doctor or Dr Valencia   Also , make appointment to see your PCP within 1 week of discharge from the hospital.      Plan of care, test results and findings were  discussed with patient; all questions and concerns were addressed and care was aligned with patient's wishes.  PMD follow up after discharge from the hospital for continued care and outpatient monitoring was advised.  Discharge plans was discussed with care team including the nursing staff  and unit discharge planner.             MEDICAL ATTENDING DISCHARGE NOTE :    Patient is a 72y old  Male who presents with a chief complaint of Upper GI Bleed (02 Oct 2022 12:55)      INTERVAL HPI / OVERNIGHT EVENTS: patient with uneventful night and offers no new complaints    ----------------------------------------------------------------------------------  REVIEW OF SYSTEMS: no fever; no SOB      Vital Signs Last 24 Hrs  T(C): 36.8 (03 Oct 2022 04:14), Max: 36.9 (02 Oct 2022 13:08)  T(F): 98.3 (03 Oct 2022 04:14), Max: 98.4 (02 Oct 2022 13:08)  HR: 82 (03 Oct 2022 04:14) (82 - 95)  BP: 117/76 (03 Oct 2022 04:14) (103/58 - 117/76)  BP(mean): --  RR: 20 (03 Oct 2022 04:14) (18 - 20)  SpO2: 97% (03 Oct 2022 04:14) (94% - 97%)    Parameters below as of 03 Oct 2022 04:14  Patient On (Oxygen Delivery Method): room air        _________________  PHYSICAL EXAM:  ---------------------------   NAD; Normocephalic  LUNGS - no wheezing  HEART: S1 S2+   ABDOMEN: Soft, Nontender, non distended  EXTREMITIES: no cyanosis; no edema      _________________________________________________  LABS:                        8.3    6.11  )-----------( 154      ( 03 Oct 2022 06:30 )             25.3     10-03    144  |  109<H>  |  20  ----------------------------<  110<H>  4.0   |  31  |  0.84    Ca    8.3<L>      03 Oct 2022 06:30    TPro  6.1  /  Alb  3.1<L>  /  TBili  0.8  /  DBili  x   /  AST  23  /  ALT  20  /  AlkPhos  62  10-03    PT/INR - ( 01 Oct 2022 23:35 )   PT: 13.5 sec;   INR: 1.15 ratio         PTT - ( 01 Oct 2022 23:35 )  PTT:24.5 sec    A/P: admitted for GI bleed with acute blood loss anemia due to gastric ulcers  EGD- findings  noted- (moderate hiatal hernia; multiple superficial gastric ulcers)  Continue PPI- Protonix 40mg 2x daily x 30 days then once daily thereafter  Please follow up with your primary GI doctor or Dr Valencia/Clemente   Additionally, please make appointment to see your PCP within 1 week of discharge from the hospital.      Plan of care, test results and findings were  discussed with patient; all questions and concerns were addressed and care was aligned with patient's wishes.  PMD follow up after discharge from the hospital for continued care and outpatient monitoring was advised.  Discharge plans was discussed with care team including the nursing staff  and unit discharge planner.

## 2022-10-03 NOTE — DISCHARGE NOTE NURSING/CASE MANAGEMENT/SOCIAL WORK - NSDCPEFALRISK_GEN_ALL_CORE
For information on Fall & Injury Prevention, visit: [email or web address] OR  [email or web address] OR  https://www.cdc.gov/steadi/patient.html

## 2022-10-03 NOTE — PROGRESS NOTE ADULT - SUBJECTIVE AND OBJECTIVE BOX
s/p  upper gastrointestinal endoscopy    moderate hiatal hernia  multiple superficial gastric ulcers  antrum biopsied r/o h pylori  normal duodenum    rec  reg diet  proton pump inhibitor bid  f/u path  repeat  upper gastrointestinal endoscopy in 8 weeks

## 2022-10-03 NOTE — DISCHARGE NOTE PROVIDER - NSDCMRMEDTOKEN_GEN_ALL_CORE_FT
Vytorin 10 mg-20 mg oral tablet: 1 tab(s) orally once a day   ferrous sulfate 325 mg (65 mg elemental iron) oral tablet: 1 tab(s) orally once a day   pantoprazole 40 mg oral delayed release tablet: 1 tab(s) orally 2 times a day   Senna 8.6 mg oral tablet: 2 tab(s) orally once a day (at bedtime), As Needed for constipation  Vytorin 10 mg-20 mg oral tablet: 1 tab(s) orally once a day

## 2022-10-03 NOTE — DISCHARGE NOTE NURSING/CASE MANAGEMENT/SOCIAL WORK - PATIENT PORTAL LINK FT
You can access the FollowMyHealth Patient Portal offered by [facility] by registering at the following website: [email or web address]. By joining [email or web address]’s FollowMyHealth portal, you will also be able to view your health information using other applications (apps) compatible with our system.

## 2022-10-03 NOTE — DISCHARGE NOTE PROVIDER - HOSPITAL COURSE
HPI:  Patient is a 73 y/o M with PMHx of HLD, anxiety, and constipation who presents to the ED with nausea, abdominal pain, and hematemesis. Patient was in normal state of health until 2 days ago when he began having abdominal pain and nausea after an omelet he ate at a diner. No one else who ate at the restaurant is experiencing similar symptoms. The symptoms persisted into the following morning, but patient also noticed weakness and disorientation. He reports falling from a standing position twice yesterday. The first episode occurred on his way to the bathroom. He reports losing his balance, but denies dizziness, warmth, or loss of consciousness prior to the episode. He was able to return to his feet shortly afterwards. Later in the same day, patient lost consciousness after several episodes of hematemesis in the bathroom while standing. He was awakened by his wife's home aide. He has little recollection of the second event, but denies pre-syncopal symptoms or an aura prior to losing consciousness. The home aide did not report seeing seizure-like activity. Patient had 2 more episodes of hematemesis in the car ride to the hospital and in the ED. This has never happened before. Patient reports increased stress over the past 2 months, following his wife's stroke, and reports increased abdominal distress when he becomes emotional. On several occasions in the past 2 years, patient has had "anxiety attacks" described as tearfulness. Currently denies CP, SOB, abd pain, headache, leg pain, leg weakness.    ED Course  Vitals: /70, HR 94, RR 16, SpO2 100% RA, T 98.3  Labs: Hgb 10.5, Hct 31.9, Lactate 2.4 --> 1.9, BUN 50, Glu 187  Imaging: CT H- no acute infarctions, CT A/P  - No colitis. No evidence of gastrointestinal bleeding. Diverticulosis w/o bleed  ECG: NSR    Given NS bolus, ondansetron, protonix  (02 Oct 2022 02:36)      COURSE: Patient was admitted for GI bleed causing acute blood loss anemia. Seen in consultation by GI. EGD done on 10/3- revealed...  The hospital course of this patient was uncomplicated and the patient was discharged in stable medical condition. HPI:  Patient is a 71 y/o M with PMHx of HLD, anxiety, and constipation who presents to the ED with nausea, abdominal pain, and hematemesis. Patient was in normal state of health until 2 days ago when he began having abdominal pain and nausea after an omelet he ate at a diner. No one else who ate at the restaurant is experiencing similar symptoms. The symptoms persisted into the following morning, but patient also noticed weakness and disorientation. He reports falling from a standing position twice yesterday. The first episode occurred on his way to the bathroom. He reports losing his balance, but denies dizziness, warmth, or loss of consciousness prior to the episode. He was able to return to his feet shortly afterwards. Later in the same day, patient lost consciousness after several episodes of hematemesis in the bathroom while standing. He was awakened by his wife's home aide. He has little recollection of the second event, but denies pre-syncopal symptoms or an aura prior to losing consciousness. The home aide did not report seeing seizure-like activity. Patient had 2 more episodes of hematemesis in the car ride to the hospital and in the ED. This has never happened before. Patient reports increased stress over the past 2 months, following his wife's stroke, and reports increased abdominal distress when he becomes emotional. On several occasions in the past 2 years, patient has had "anxiety attacks" described as tearfulness. Currently denies CP, SOB, abd pain, headache, leg pain, leg weakness.    ED Course  Vitals: /70, HR 94, RR 16, SpO2 100% RA, T 98.3  Labs: Hgb 10.5, Hct 31.9, Lactate 2.4 --> 1.9, BUN 50, Glu 187  Imaging: CT H- no acute infarctions, CT A/P  - No colitis. No evidence of gastrointestinal bleeding. Diverticulosis w/o bleed  ECG: NSR    Given NS bolus, ondansetron, protonix  (02 Oct 2022 02:36)      COURSE: Patient was admitted for GI bleed causing acute blood loss anemia. Seen in consultation by GI. EGD done on 10/3- revealed moderate hiatal hernia  multiple superficial gastric ulcers. He was prescribed Protonix 40mg 2 x daily for one month and once daily thereafter. Outpatient GI follow up strongly advised.  The hospital course of this patient was uncomplicated and the patient was discharged in stable medical condition.

## 2022-10-03 NOTE — DISCHARGE NOTE PROVIDER - NSDCCPCAREPLAN_GEN_ALL_CORE_FT
PRINCIPAL DISCHARGE DIAGNOSIS  Diagnosis: Acute GI bleeding  Assessment and Plan of Treatment: You were hospitalized due to vomiting blood suspeceted to be due to upper GI bleed which resulted in acute blood loss anemia. You were seen in consulattion by the gastroenetrologist and treated with PPIs. You had upper endoscopy (EGD) which revelaed multiple gastric ulcers and hiatal hernia. Your hemoglobin has been stable although lower than your usual. Please continue iron supplement as prescribed .  Follow up with your PCP within one week for repeat nblood tetss and continued care. Follow up with the gastroenterologist within 2 weeks of discharge.

## 2022-10-03 NOTE — DISCHARGE NOTE PROVIDER - CARE PROVIDER_API CALL
Dr Metcalf,   Please make appointment to see your PCP within 1 week of discharge from the hospital.  Follow up with the gastroenterologist also  Phone: (   )    -  Fax: (   )    -  Follow Up Time:

## 2022-10-03 NOTE — DISCHARGE NOTE PROVIDER - PROVIDER TOKENS
FREE:[LAST:[Dr Metcalf],PHONE:[(   )    -],FAX:[(   )    -],ADDRESS:[Please make appointment to see your PCP within 1 week of discharge from the hospital.  Follow up with the gastroenterologist also]]

## 2022-10-20 PROCEDURE — 82746 ASSAY OF FOLIC ACID SERUM: CPT

## 2022-10-20 PROCEDURE — 83690 ASSAY OF LIPASE: CPT

## 2022-10-20 PROCEDURE — 86803 HEPATITIS C AB TEST: CPT

## 2022-10-20 PROCEDURE — 88305 TISSUE EXAM BY PATHOLOGIST: CPT

## 2022-10-20 PROCEDURE — 80061 LIPID PANEL: CPT

## 2022-10-20 PROCEDURE — 85025 COMPLETE CBC W/AUTO DIFF WBC: CPT

## 2022-10-20 PROCEDURE — 88312 SPECIAL STAINS GROUP 1: CPT

## 2022-10-20 PROCEDURE — 85730 THROMBOPLASTIN TIME PARTIAL: CPT

## 2022-10-20 PROCEDURE — 85027 COMPLETE CBC AUTOMATED: CPT

## 2022-10-20 PROCEDURE — 96375 TX/PRO/DX INJ NEW DRUG ADDON: CPT

## 2022-10-20 PROCEDURE — 96361 HYDRATE IV INFUSION ADD-ON: CPT

## 2022-10-20 PROCEDURE — 87635 SARS-COV-2 COVID-19 AMP PRB: CPT

## 2022-10-20 PROCEDURE — 71045 X-RAY EXAM CHEST 1 VIEW: CPT

## 2022-10-20 PROCEDURE — 83550 IRON BINDING TEST: CPT

## 2022-10-20 PROCEDURE — 96374 THER/PROPH/DIAG INJ IV PUSH: CPT

## 2022-10-20 PROCEDURE — 36415 COLL VENOUS BLD VENIPUNCTURE: CPT

## 2022-10-20 PROCEDURE — 84484 ASSAY OF TROPONIN QUANT: CPT

## 2022-10-20 PROCEDURE — 74174 CTA ABD&PLVS W/CONTRAST: CPT | Mod: MA

## 2022-10-20 PROCEDURE — 43239 EGD BIOPSY SINGLE/MULTIPLE: CPT

## 2022-10-20 PROCEDURE — 83605 ASSAY OF LACTIC ACID: CPT

## 2022-10-20 PROCEDURE — 85610 PROTHROMBIN TIME: CPT

## 2022-10-20 PROCEDURE — 83540 ASSAY OF IRON: CPT

## 2022-10-20 PROCEDURE — 82728 ASSAY OF FERRITIN: CPT

## 2022-10-20 PROCEDURE — 86901 BLOOD TYPING SEROLOGIC RH(D): CPT

## 2022-10-20 PROCEDURE — 82607 VITAMIN B-12: CPT

## 2022-10-20 PROCEDURE — 70450 CT HEAD/BRAIN W/O DYE: CPT | Mod: MA

## 2022-10-20 PROCEDURE — 86850 RBC ANTIBODY SCREEN: CPT

## 2022-10-20 PROCEDURE — 99285 EMERGENCY DEPT VISIT HI MDM: CPT | Mod: 25

## 2022-10-20 PROCEDURE — 88342 IMHCHEM/IMCYTCHM 1ST ANTB: CPT

## 2022-10-20 PROCEDURE — 80053 COMPREHEN METABOLIC PANEL: CPT

## 2022-10-20 PROCEDURE — 93005 ELECTROCARDIOGRAM TRACING: CPT

## 2022-10-20 PROCEDURE — 97162 PT EVAL MOD COMPLEX 30 MIN: CPT

## 2022-10-20 PROCEDURE — 86900 BLOOD TYPING SEROLOGIC ABO: CPT

## 2022-11-16 NOTE — ED ADULT TRIAGE NOTE - NS ED NURSE DIRECT TO ROOM YN
----- Message from Zuri Segovia MD sent at 11/15/2022 10:28 AM EST -----  Please call the patient regarding his abnormal result  Allergy test is showing some possible allergy to egg white wheat and milk  I want to get opinion from allergist specialist   Order placed 
Called and spoke to patient gave results  Alix Wang
No

## 2023-02-22 ENCOUNTER — EMERGENCY (EMERGENCY)
Facility: HOSPITAL | Age: 73
LOS: 1 days | Discharge: ANOTHER HOSPITAL | End: 2023-02-22
Attending: EMERGENCY MEDICINE | Admitting: EMERGENCY MEDICINE
Payer: MEDICARE

## 2023-02-22 ENCOUNTER — INPATIENT (INPATIENT)
Facility: HOSPITAL | Age: 73
LOS: 1 days | Discharge: HOME | DRG: 522 | End: 2023-02-24
Attending: STUDENT IN AN ORGANIZED HEALTH CARE EDUCATION/TRAINING PROGRAM | Admitting: STUDENT IN AN ORGANIZED HEALTH CARE EDUCATION/TRAINING PROGRAM
Payer: MEDICARE

## 2023-02-22 ENCOUNTER — TRANSCRIPTION ENCOUNTER (OUTPATIENT)
Age: 73
End: 2023-02-22

## 2023-02-22 VITALS
DIASTOLIC BLOOD PRESSURE: 82 MMHG | HEART RATE: 63 BPM | OXYGEN SATURATION: 95 % | SYSTOLIC BLOOD PRESSURE: 120 MMHG | TEMPERATURE: 99 F | RESPIRATION RATE: 16 BRPM

## 2023-02-22 VITALS
TEMPERATURE: 98 F | DIASTOLIC BLOOD PRESSURE: 76 MMHG | HEART RATE: 72 BPM | OXYGEN SATURATION: 96 % | SYSTOLIC BLOOD PRESSURE: 123 MMHG | RESPIRATION RATE: 18 BRPM

## 2023-02-22 VITALS
DIASTOLIC BLOOD PRESSURE: 90 MMHG | TEMPERATURE: 99 F | RESPIRATION RATE: 18 BRPM | OXYGEN SATURATION: 98 % | WEIGHT: 225.09 LBS | SYSTOLIC BLOOD PRESSURE: 151 MMHG | HEART RATE: 80 BPM | HEIGHT: 71 IN

## 2023-02-22 DIAGNOSIS — Z20.822 CONTACT WITH AND (SUSPECTED) EXPOSURE TO COVID-19: ICD-10-CM

## 2023-02-22 DIAGNOSIS — Z98.89 OTHER SPECIFIED POSTPROCEDURAL STATES: Chronic | ICD-10-CM

## 2023-02-22 DIAGNOSIS — E78.5 HYPERLIPIDEMIA, UNSPECIFIED: ICD-10-CM

## 2023-02-22 DIAGNOSIS — Z98.890 OTHER SPECIFIED POSTPROCEDURAL STATES: ICD-10-CM

## 2023-02-22 DIAGNOSIS — M25.552 PAIN IN LEFT HIP: ICD-10-CM

## 2023-02-22 DIAGNOSIS — S72.002A FRACTURE OF UNSPECIFIED PART OF NECK OF LEFT FEMUR, INITIAL ENCOUNTER FOR CLOSED FRACTURE: ICD-10-CM

## 2023-02-22 DIAGNOSIS — M84.459A PATHOLOGICAL FRACTURE, HIP, UNSPECIFIED, INITIAL ENCOUNTER FOR FRACTURE: ICD-10-CM

## 2023-02-22 DIAGNOSIS — Z91.040 LATEX ALLERGY STATUS: ICD-10-CM
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
## 2023-02-22 PROBLEM — S82.401A UNSPECIFIED FRACTURE OF SHAFT OF RIGHT FIBULA, INITIAL ENCOUNTER FOR CLOSED FRACTURE: Chronic | Status: ACTIVE | Noted: 2022-10-02

## 2023-02-22 LAB
ANION GAP SERPL CALC-SCNC: 5 MMOL/L — SIGNIFICANT CHANGE UP (ref 5–17)
APTT BLD: 26.9 SEC — LOW (ref 27.5–35.5)
BUN SERPL-MCNC: 15 MG/DL — SIGNIFICANT CHANGE UP (ref 7–23)
CALCIUM SERPL-MCNC: 9 MG/DL — SIGNIFICANT CHANGE UP (ref 8.5–10.1)
CHLORIDE SERPL-SCNC: 108 MMOL/L — SIGNIFICANT CHANGE UP (ref 96–108)
CO2 SERPL-SCNC: 28 MMOL/L — SIGNIFICANT CHANGE UP (ref 22–31)
CREAT SERPL-MCNC: 0.96 MG/DL — SIGNIFICANT CHANGE UP (ref 0.5–1.3)
EGFR: 84 ML/MIN/1.73M2 — SIGNIFICANT CHANGE UP
FLUAV AG NPH QL: SIGNIFICANT CHANGE UP
FLUBV AG NPH QL: SIGNIFICANT CHANGE UP
GLUCOSE SERPL-MCNC: 129 MG/DL — HIGH (ref 70–99)
HCT VFR BLD CALC: 36.1 % — LOW (ref 39–50)
HGB BLD-MCNC: 11.1 G/DL — LOW (ref 13–17)
INR BLD: 1.12 RATIO — SIGNIFICANT CHANGE UP (ref 0.88–1.16)
MCHC RBC-ENTMCNC: 26.2 PG — LOW (ref 27–34)
MCHC RBC-ENTMCNC: 30.7 GM/DL — LOW (ref 32–36)
MCV RBC AUTO: 85.1 FL — SIGNIFICANT CHANGE UP (ref 80–100)
NRBC # BLD: 0 /100 WBCS — SIGNIFICANT CHANGE UP (ref 0–0)
PLATELET # BLD AUTO: 238 K/UL — SIGNIFICANT CHANGE UP (ref 150–400)
POTASSIUM SERPL-MCNC: 3.9 MMOL/L — SIGNIFICANT CHANGE UP (ref 3.5–5.3)
POTASSIUM SERPL-SCNC: 3.9 MMOL/L — SIGNIFICANT CHANGE UP (ref 3.5–5.3)
PROTHROM AB SERPL-ACNC: 13.1 SEC — SIGNIFICANT CHANGE UP (ref 10.5–13.4)
RBC # BLD: 4.24 M/UL — SIGNIFICANT CHANGE UP (ref 4.2–5.8)
RBC # FLD: 16 % — HIGH (ref 10.3–14.5)
RSV RNA NPH QL NAA+NON-PROBE: SIGNIFICANT CHANGE UP
SARS-COV-2 RNA SPEC QL NAA+PROBE: SIGNIFICANT CHANGE UP
SODIUM SERPL-SCNC: 141 MMOL/L — SIGNIFICANT CHANGE UP (ref 135–145)
WBC # BLD: 7.53 K/UL — SIGNIFICANT CHANGE UP (ref 3.8–10.5)
WBC # FLD AUTO: 7.53 K/UL — SIGNIFICANT CHANGE UP (ref 3.8–10.5)

## 2023-02-22 PROCEDURE — 99285 EMERGENCY DEPT VISIT HI MDM: CPT

## 2023-02-22 PROCEDURE — 99285 EMERGENCY DEPT VISIT HI MDM: CPT | Mod: 25

## 2023-02-22 PROCEDURE — 36415 COLL VENOUS BLD VENIPUNCTURE: CPT

## 2023-02-22 PROCEDURE — 80048 BASIC METABOLIC PNL TOTAL CA: CPT

## 2023-02-22 PROCEDURE — 96376 TX/PRO/DX INJ SAME DRUG ADON: CPT

## 2023-02-22 PROCEDURE — 73552 X-RAY EXAM OF FEMUR 2/>: CPT | Mod: 26,LT

## 2023-02-22 PROCEDURE — 71045 X-RAY EXAM CHEST 1 VIEW: CPT | Mod: 26

## 2023-02-22 PROCEDURE — 73502 X-RAY EXAM HIP UNI 2-3 VIEWS: CPT | Mod: 26,LT

## 2023-02-22 PROCEDURE — 93005 ELECTROCARDIOGRAM TRACING: CPT

## 2023-02-22 PROCEDURE — 86900 BLOOD TYPING SEROLOGIC ABO: CPT

## 2023-02-22 PROCEDURE — 93010 ELECTROCARDIOGRAM REPORT: CPT

## 2023-02-22 PROCEDURE — 99221 1ST HOSP IP/OBS SF/LOW 40: CPT | Mod: 57

## 2023-02-22 PROCEDURE — 85027 COMPLETE CBC AUTOMATED: CPT

## 2023-02-22 PROCEDURE — 85730 THROMBOPLASTIN TIME PARTIAL: CPT

## 2023-02-22 PROCEDURE — 27232 TREAT THIGH FRACTURE: CPT | Mod: LT

## 2023-02-22 PROCEDURE — 73552 X-RAY EXAM OF FEMUR 2/>: CPT

## 2023-02-22 PROCEDURE — 73502 X-RAY EXAM HIP UNI 2-3 VIEWS: CPT

## 2023-02-22 PROCEDURE — 86850 RBC ANTIBODY SCREEN: CPT

## 2023-02-22 PROCEDURE — 99223 1ST HOSP IP/OBS HIGH 75: CPT

## 2023-02-22 PROCEDURE — 86901 BLOOD TYPING SEROLOGIC RH(D): CPT

## 2023-02-22 PROCEDURE — 96374 THER/PROPH/DIAG INJ IV PUSH: CPT

## 2023-02-22 PROCEDURE — 71045 X-RAY EXAM CHEST 1 VIEW: CPT

## 2023-02-22 PROCEDURE — 85610 PROTHROMBIN TIME: CPT

## 2023-02-22 PROCEDURE — 87637 SARSCOV2&INF A&B&RSV AMP PRB: CPT

## 2023-02-22 RX ORDER — MORPHINE SULFATE 50 MG/1
2 CAPSULE, EXTENDED RELEASE ORAL EVERY 4 HOURS
Refills: 0 | Status: DISCONTINUED | OUTPATIENT
Start: 2023-02-22 | End: 2023-02-23

## 2023-02-22 RX ORDER — LANOLIN ALCOHOL/MO/W.PET/CERES
3 CREAM (GRAM) TOPICAL AT BEDTIME
Refills: 0 | Status: DISCONTINUED | OUTPATIENT
Start: 2023-02-22 | End: 2023-02-24

## 2023-02-22 RX ORDER — HEPARIN SODIUM 5000 [USP'U]/ML
5000 INJECTION INTRAVENOUS; SUBCUTANEOUS EVERY 12 HOURS
Refills: 0 | Status: COMPLETED | OUTPATIENT
Start: 2023-02-22 | End: 2023-02-22

## 2023-02-22 RX ORDER — HYDROMORPHONE HYDROCHLORIDE 2 MG/ML
0.5 INJECTION INTRAMUSCULAR; INTRAVENOUS; SUBCUTANEOUS EVERY 6 HOURS
Refills: 0 | Status: DISCONTINUED | OUTPATIENT
Start: 2023-02-22 | End: 2023-02-23

## 2023-02-22 RX ORDER — HYDROMORPHONE HYDROCHLORIDE 2 MG/ML
0.5 INJECTION INTRAMUSCULAR; INTRAVENOUS; SUBCUTANEOUS ONCE
Refills: 0 | Status: DISCONTINUED | OUTPATIENT
Start: 2023-02-22 | End: 2023-02-22

## 2023-02-22 RX ORDER — SODIUM CHLORIDE 9 MG/ML
1000 INJECTION, SOLUTION INTRAVENOUS
Refills: 0 | Status: COMPLETED | OUTPATIENT
Start: 2023-02-22 | End: 2023-02-23

## 2023-02-22 RX ORDER — ONDANSETRON 8 MG/1
4 TABLET, FILM COATED ORAL EVERY 8 HOURS
Refills: 0 | Status: DISCONTINUED | OUTPATIENT
Start: 2023-02-22 | End: 2023-02-24

## 2023-02-22 RX ORDER — ACETAMINOPHEN 500 MG
650 TABLET ORAL EVERY 6 HOURS
Refills: 0 | Status: DISCONTINUED | OUTPATIENT
Start: 2023-02-22 | End: 2023-02-23

## 2023-02-22 RX ADMIN — HYDROMORPHONE HYDROCHLORIDE 0.5 MILLIGRAM(S): 2 INJECTION INTRAMUSCULAR; INTRAVENOUS; SUBCUTANEOUS at 11:01

## 2023-02-22 RX ADMIN — HEPARIN SODIUM 5000 UNIT(S): 5000 INJECTION INTRAVENOUS; SUBCUTANEOUS at 18:11

## 2023-02-22 RX ADMIN — SODIUM CHLORIDE 50 MILLILITER(S): 9 INJECTION, SOLUTION INTRAVENOUS at 16:34

## 2023-02-22 RX ADMIN — MORPHINE SULFATE 2 MILLIGRAM(S): 50 CAPSULE, EXTENDED RELEASE ORAL at 16:02

## 2023-02-22 RX ADMIN — Medication 3 MILLIGRAM(S): at 21:04

## 2023-02-22 RX ADMIN — MORPHINE SULFATE 2 MILLIGRAM(S): 50 CAPSULE, EXTENDED RELEASE ORAL at 16:32

## 2023-02-22 RX ADMIN — MORPHINE SULFATE 2 MILLIGRAM(S): 50 CAPSULE, EXTENDED RELEASE ORAL at 20:15

## 2023-02-22 RX ADMIN — MORPHINE SULFATE 2 MILLIGRAM(S): 50 CAPSULE, EXTENDED RELEASE ORAL at 21:00

## 2023-02-22 RX ADMIN — HYDROMORPHONE HYDROCHLORIDE 0.5 MILLIGRAM(S): 2 INJECTION INTRAMUSCULAR; INTRAVENOUS; SUBCUTANEOUS at 08:59

## 2023-02-22 NOTE — ED PROVIDER NOTE - PROGRESS NOTE DETAILS
Patient was evaluated by the Ortho service.  Orthopedic surgeon on-call Dr. Redmond will operate on patient tomorrow at [facility] so patient will be transferred to [facility] for admission there today.  I have discussed the case with the hospitalist over there Dr. Hollins and patient's been accepted for transfer and admission to her service.

## 2023-02-22 NOTE — ED PROVIDER NOTE - DIFFERENTIAL DIAGNOSIS
Differential Diagnosis Differential diagnosis includes left hip fracture either intertrochanteric subcapital or femoral neck, pelvic fracture, musculoskeletal pain

## 2023-02-22 NOTE — ED PROVIDER NOTE - CLINICAL SUMMARY MEDICAL DECISION MAKING FREE TEXT BOX
Left hip pain post slip and fall here now requiring extensive evaluation labs EKG imaging including pelvis and hip as well as pain medication and IV fluids.

## 2023-02-22 NOTE — CONSULT NOTE ADULT - SUBJECTIVE AND OBJECTIVE BOX
Pt Name: SILVESTRE RACHEL    MRN: [id number]    HPI: Patient is a 73y Male with Patient with PMHx HTN, c/o left hip pain s/p fall at home this morning. Patient states he was going out to get the paper and slipped and fell onto his left hip. He denies any other injuries, numbness, tingling, HS, and LOC. At baseline, patient ambulates independently and is a . Patient states he is very active at baseline. Patient denies any blood thinners except ASA 81mg every 2-3 days. Patient has been NPO since last night.     PAST MEDICAL & SURGICAL HISTORY:  Scarlet fever  Mumps  Knee torn cartilage, left  HLD (hyperlipidemia)  Right fibular fracture  S/P arthroscopic knee surgery  S/P ORIF (open reduction internal fixation) fracture      Allergies: latex (Rash)  No Known Drug Allergies                              11.1   7.53  )-----------( 238      ( 22 Feb 2023 09:00 )             36.1     02-22    141  |  108  |  15  ----------------------------<  129<H>  3.9   |  28  |  0.96    Ca    9.0      22 Feb 2023 09:00      PT/INR - ( 22 Feb 2023 09:00 )   PT: 13.1 sec;   INR: 1.12 ratio         PTT - ( 22 Feb 2023 09:00 )  PTT:26.9 sec      PHYSICAL EXAM:    Vital Signs Last 24 Hrs  T(C): 37.2 (22 Feb 2023 08:19), Max: 37.2 (22 Feb 2023 08:19)  T(F): 98.9 (22 Feb 2023 08:19), Max: 98.9 (22 Feb 2023 08:19)  HR: 80 (22 Feb 2023 08:19) (80 - 80)  BP: 151/90 (22 Feb 2023 08:19) (151/90 - 151/90)  BP(mean): --  RR: 18 (22 Feb 2023 08:19) (18 - 18)  SpO2: 98% (22 Feb 2023 08:19) (98% - 98%)    Parameters below as of 22 Feb 2023 08:19  Patient On (Oxygen Delivery Method): room air        Physical Exam:  General: NAD, Alert, Awake and oriented  Respiratory: Symmetric chest wall expansion bilaterally, no accessory muscle use  LEFT LE:  Leg shortened and externally rotated.  Scab noted over lateral ankle, otherwise skin intact. +heel strike, EHL/TA/GS intact. Unable to actively SLR. SILT L2-S1. No bony TTP to hip, knee or ankle. Patient endorses groin pain, though NTTP. 2+ DP/PT pulses with brisk cap refill distally. Compartments soft and compressible.     Secondary Survey:   RLE/RUE/LUE: No TTP over bony prominences, SILT, palpable pulses, full/painless range of motion, compartments soft  Spine: No bony tenderness. No palpable stepoffs.        Imaging:   Xray Left hip and Pelvis: Left femoral neck fracture     Orthopedic A/P:    Pt is a  73y Male with L fem neck fx       - Plan for OR with Dr. Redmond for *****  - Pain control PRN  -NPO for OR  -Medical management appreciated. Please document medical clearance for OR ****  -IV fluids to start once NPO  -Pre-operative ABX   -DVT PE ppx, SCDs, hold DVT chemoppx before OR  -Final plan pending discussion with Dr. Redmond Pt Name: SILVESTRE RACHEL    MRN: [id number]    HPI: Patient is a 73y Male with Patient with PMHx HTN, c/o left hip pain s/p fall at home this morning. Patient states he was going out to get the paper and slipped and fell onto his left hip. He denies any other injuries, numbness, tingling, HS, and LOC. At baseline, patient ambulates independently and is an active member of the community and a . Patient states he is very active at baseline. Patient denies any blood thinners except ASA 81mg every 2-3 days. Patient has been NPO since last night.     PAST MEDICAL & SURGICAL HISTORY:  Scarlet fever  Mumps  Knee torn cartilage, left  HLD (hyperlipidemia)  Right fibular fracture  S/P arthroscopic knee surgery  S/P ORIF (open reduction internal fixation) fracture      Allergies: latex (Rash)  No Known Drug Allergies                              11.1   7.53  )-----------( 238      ( 22 Feb 2023 09:00 )             36.1     02-22    141  |  108  |  15  ----------------------------<  129<H>  3.9   |  28  |  0.96    Ca    9.0      22 Feb 2023 09:00      PT/INR - ( 22 Feb 2023 09:00 )   PT: 13.1 sec;   INR: 1.12 ratio         PTT - ( 22 Feb 2023 09:00 )  PTT:26.9 sec      PHYSICAL EXAM:    Vital Signs Last 24 Hrs  T(C): 37.2 (22 Feb 2023 08:19), Max: 37.2 (22 Feb 2023 08:19)  T(F): 98.9 (22 Feb 2023 08:19), Max: 98.9 (22 Feb 2023 08:19)  HR: 80 (22 Feb 2023 08:19) (80 - 80)  BP: 151/90 (22 Feb 2023 08:19) (151/90 - 151/90)  BP(mean): --  RR: 18 (22 Feb 2023 08:19) (18 - 18)  SpO2: 98% (22 Feb 2023 08:19) (98% - 98%)    Parameters below as of 22 Feb 2023 08:19  Patient On (Oxygen Delivery Method): room air        Physical Exam:  General: NAD, Alert, Awake and oriented  Respiratory: Symmetric chest wall expansion bilaterally, no accessory muscle use  LEFT LE:  Leg shortened and externally rotated.  Scab noted over lateral ankle, otherwise skin intact. +heel strike, EHL/FHL/TA/GS intact. Unable to actively SLR. SILT L2-S1. No bony TTP to hip, knee or ankle. Patient endorses groin pain, though NTTP. 2+ DP/PT pulses with brisk cap refill distally. Compartments soft and compressible.     Secondary Survey:   RLE/RUE/LUE: No TTP over bony prominences, SILT, palpable pulses, full/painless range of motion, compartments soft  Spine: No bony tenderness. No palpable stepoffs.        Imaging:   Xray Left hip and Pelvis: Left femoral neck fracture     Orthopedic A/P:    Pt is a  73y Male with L fem neck fx     - Plan for OR with Dr. Redmond for L [facility] at [facility]   - Pain control PRN  -NPO after midnight for OR  - FU Preop labs  -Medical management appreciated. Please document medical clearance for OR   -IV fluids to start once NPO  -Pre-operative ABX   -DVT PE ppx, SCDs, hold DVT chemoppx before OR  -D/w Nyla who agrees with plan.

## 2023-02-22 NOTE — H&P ADULT - ASSESSMENT
73y Male with Patient with PMHx HLD is admitted for left hip fx    #Left hip fx  GMF  Ortho preop and post op orders  EKG reviewed  No major cardiac hx  Medically optimized for procedure  NPO after midnight    #HLD  on vytorin at home  will use simvistatin     #DVT Proph  heparin  hold morning dose

## 2023-02-22 NOTE — H&P ADULT - NSHPPHYSICALEXAM_GEN_ALL_CORE
General: NAD, Alert, Awake and oriented  Respiratory: Symmetric chest wall expansion bilaterally, no accessory muscle use  LEFT LE:  Leg shortened and externally rotated.  Scab noted over lateral ankle, otherwise skin intact. +heel strike, EHL/FHL/TA/GS intact. Unable to actively SLR. SILT L2-S1. No bony TTP to hip, knee or ankle. Patient endorses groin pain, though NTTP. 2+ DP/PT pulses with brisk cap refill distally. Compartments soft and compressible.     GENERAL: NAD, well-groomed, well-developed  HEAD:  Atraumatic, Normocephalic  EYES: EOMI, PERRLA, conjunctiva and sclera clear  ENMT: No tonsillar erythema, exudates, or enlargement; Moist mucous membranes  NECK: Supple, No JVD, Normal thyroid  CHEST/LUNG: Clear to percussion bilaterally; No rales, rhonchi, wheezing, or rubs  HEART: Regular rate and rhythm; No murmurs, rubs, or gallops  ABDOMEN: Soft, Nontender, Nondistended; Bowel sounds present  EXTREMITIES:  2+ Peripheral Pulses, No clubbing, cyanosis, or edema  LEFT LE:  Leg shortened and externally rotated.  Scab noted over lateral ankle, otherwise skin intact  NERVOUS SYSTEM:  Alert & Oriented X3, Good concentration;   SKIN: No rashes or lesions GENERAL: NAD, well-groomed, well-developed  HEAD:  Atraumatic, Normocephalic  EYES: EOMI, PERRLA, conjunctiva and sclera clear  ENMT: No tonsillar erythema, exudates, or enlargement; Moist mucous membranes  NECK: Supple, No JVD, Normal thyroid  CHEST/LUNG: Clear to percussion bilaterally; No rales, rhonchi, wheezing, or rubs  HEART: Regular rate and rhythm; No murmurs, rubs, or gallops  ABDOMEN: Soft, Nontender, Nondistended; Bowel sounds present  EXTREMITIES:  2+ Peripheral Pulses, No clubbing, cyanosis, or edema  LEFT LE:  Leg shortened and externally rotated.  Scab noted over lateral ankle, otherwise skin intact  NERVOUS SYSTEM:  Alert & Oriented X3, Good concentration;   SKIN: No rashes or lesions

## 2023-02-22 NOTE — H&P ADULT - HISTORY OF PRESENT ILLNESS
73y Male with Patient with PMHx HLD, c/o left hip pain s/p fall at home this morning. Patient states he was going out to get the paper and slipped and fell onto his left hip. He denies any other injuries, numbness, tingling, HS, and LOC. At baseline, patient ambulates independently and is an active member of the community and a . Patient states he is very active at baseline. Patient denies any blood thinners except ASA 81mg every 2-3 days. Patient has been NPO since last night.

## 2023-02-22 NOTE — ED PROVIDER NOTE - PHYSICAL EXAMINATION
Gen: WD/WN white male in mild to moderate distress secondary to hip pain  Head:  NC/AT  ENT:  PERRLA, EOMI, Mucous membranes moist  Neck: Supple/No Midline tenderness/No meningismus  CV:  RRR w/o any murmurs/rubs or gallops  Pulm:  Clear to A and P  Abd:  Soft, nontender, nondistended, +BS, no rebound/guarding  Back:  no CVAT  Ext:  warm, well perfused, moving all extremities spontaneously, no peripheral edema, distal pulses intact  Skin: intact w/o rash  Msk: Marked tenderness to palpation left hip with external rotation and foreshortening of LLE.  Neuro:  A&Ox3, no focal neuro deficit, speech clear

## 2023-02-22 NOTE — ED ADULT NURSE NOTE - NURSING MUSC STRENGTH
TWG=43lbs  - 3 pound increase since last appt 2 weeks prior  Discussed/Counseled lifestyle modifications  CTM   limitations in strength

## 2023-02-22 NOTE — ED PROVIDER NOTE - OBJECTIVE STATEMENT
Patient is a 73-year-old white male with history of hyperlipidemia as well as multiple orthopedic injuries/surgery who presents now to the emergency department [facility] complaining of left hip pain.  Patient states that he was well until slipping and falling at home landing on left hip developing severe left hip pain with inability to ambulate secondary to pain.  Pain is worsened with any movement of the left hip including flexion or rotation of either external or internal.  There was no head injury no back injury and patient denies any loss of consciousness no headache no neck pain no chest pain no abdominal pain no back pain.

## 2023-02-22 NOTE — ED ADULT NURSE NOTE - OBJECTIVE STATEMENT
Pt received in bed alert and oriented resting in bed with the c/o left hip pain 2nd to a trip and fall. Pt states that he slip and fall this am. Pt denies any LOC. As per Md's orders IV khang placed blood specimen obtained and sent to the lab. Meds given and tolerated well. Pt stable and nursing care ongoing and

## 2023-02-22 NOTE — PATIENT PROFILE ADULT - VISION (WITH CORRECTIVE LENSES IF THE PATIENT USUALLY WEARS THEM):
wears  glasses for driving/Normal vision: sees adequately in most situations; can see medication labels, newsprint

## 2023-02-22 NOTE — CONSULT NOTE ADULT - SUBJECTIVE AND OBJECTIVE BOX
HPI  73yMale w/ c/o left hip pain s/p mechanical slip and fall. Patient reports he was walking to get the newspaper when he slipped on his deck step and landed directly onto left hip. Unable to bear weight in the LLE since the fall. Denies headstrike or LOC. Denies numbness/tingling in the LLE. Denies any other trauma/injuries at this time. At baseline, community ambulator w/o assistive devices. 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 More recently, he enjoys walking and swimming. He has started to notice bilateral groin pain in recent years.    ROS  Negative unless otherwise specified in HPI.    PAST MEDICAL & SURGICAL Hx  PAST MEDICAL & SURGICAL HISTORY:  Scarlet fever      Mumps      Knee torn cartilage, left      HLD (hyperlipidemia)      Right fibular fracture      S/P arthroscopic knee surgery      S/P ORIF (open reduction internal fixation) fracture          MEDICATIONS  Home Medications:  Senna 8.6 mg oral tablet: 2 tab(s) orally once a day (at bedtime), As Needed for constipation (03 Oct 2022 14:00)  Vytorin 10 mg-20 mg oral tablet: 1 tab(s) orally once a day (02 Oct 2022 02:24)      ALLERGIES  latex (Rash)  No Known Drug Allergies      FAMILY Hx  FAMILY HISTORY:  No pertinent family history in first degree relatives        SOCIAL Hx  Social History:      VITALS  Vital Signs Last 24 Hrs  T(C): 37 (22 Feb 2023 15:05), Max: 37.2 (22 Feb 2023 08:19)  T(F): 98.6 (22 Feb 2023 15:05), Max: 98.9 (22 Feb 2023 08:19)  HR: 63 (22 Feb 2023 15:05) (63 - 80)  BP: 120/82 (22 Feb 2023 15:05) (120/82 - 151/90)  BP(mean): --  RR: 16 (22 Feb 2023 15:05) (16 - 18)  SpO2: 95% (22 Feb 2023 15:05) (95% - 98%)    Parameters below as of 22 Feb 2023 11:56  Patient On (Oxygen Delivery Method): room air        PHYSICAL EXAM  Gen: Lying in bed, NAD  Resp: No increased WOB  LLE:  Skin intact, shortened and externally rotated, +edema and +ecchymosis over R hip  +TTP over R hip, no TTP along remainder of extremity; compartments soft  Limited ROM at hip 2/2 pain  +Log roll test  +Pain with axial loading  Motor: TA/EHL/GS/FHL intact  Sensory: DP/SP/Tib/Ton/Saph SILT  +DP pulse, WWP      LABS                        11.1   7.53  )-----------( 238      ( 22 Feb 2023 09:00 )             36.1     02-22    141  |  108  |  15  ----------------------------<  129<H>  3.9   |  28  |  0.96    Ca    9.0      22 Feb 2023 09:00      PT/INR - ( 22 Feb 2023 09:00 )   PT: 13.1 sec;   INR: 1.12 ratio         PTT - ( 22 Feb 2023 09:00 )  PTT:26.9 sec    IMAGING  XRs: left femoral neck fx (personal read)    The patient is a 73-year-old male with a past medical history as above sustained above injury following mechanical fall. Admitted to the hospital for medical optimization. Physical exam reveals closed injury and intact neurovascular exam. Radiographs demonstrate femoral neck hip fracture; osteoporosis. Based on injury pattern and desire to promote mobilization and decrease pain, surgical intervention was offered to the patient. The risks, benefits, alternatives of various treatment options were discussed with the patient and family.      Non-operative treatment:   Benefit - No surgical/anesthetic risk  Risks - Persistent pain, malunion/nonunion, delayed mobilization/ prolonged immobility and attendant medical risks.    Open reduction internal fixation  Benefit - higher rate of fracture union, better chance for more anatomic alignment of fracture vs nonop; no risk of dislocation  Risks - malunion, nonunion, periprosthetic fracture, general risks of surgery (infection, wound healing problems, persistent pain, neurovascular injury, need for further surgery), avascular necrosis, general perioperative medical risks (cardiac, pulmonary, renal, GI, as well as VTE)    Arthroplasty (partial vs total)  Benefit - immediate weightbearing, mobilization, no risk of fracture malunion/nonunion  Risk - higher blood loss, operative time, periprosthetic fracture, dislocation, leg length inequality vs ORIF    After discussion with the patient and family, would like to proceed with total hip arthroplasty. Patient and family agree w/ plan..    The nature and purpose of the total hip replacement, alternative method(s) of treatment, the material risks involved, and the possibility of complications were fully explained to the patient. The patient was told the most common risks and complications associated with a total hip replacement include, but are not limited to blood clots in the leg, fatal pulmonary embolism, dislocation of the prosthesis, intraoperative and postoperative fractures of the femur or acetabulum, infection, failure of the prosthesis or grafting materials, complications from anesthesia, reactions to blood transfusions, postoperative leg length inequality, instability of the hip replacement, nerve damage or injury, vascular injury, delayed wound healing, infections, other injury or even death. Also, the patient was told that after undergoing a total hip replacement there may still be pain or disability. The patient was informed that the success of this operation in part depends upon the mechanical devices which are going to be implanted and that these devices can fail or malfunction, and may need to be repaired or replaced and there are no guarantees as to the longevity of this device or its part and that it or its parts could fail prematurely. Finally, the patient was asked to follow completely and fully with all advice and recommended treatments, and that recovery and ultimate outcome are affected by their compliance with recommended treatment.     ASSESSMENT & PLAN  In brief, this is a 73yMale w/ displaced left femoral neck fracture, plan for left KARTHIKEYAN on 2/23/23 AM  -NWB LLE, bedrest  -f/u preop: CBC, BMP, coags, T&S x2, CXR, EKG, COVID, hCG [premenopausal women only]  -NPO past midnight, IVF  -hold chemical DVT ppx for OR; SCDs OK  -Medical clearance/optimization for OR  -pain control  -ice/cold compress  -obtain Vit D level. Supplementation with daily Ca/Vit D  -Outpt osteoporosis workup

## 2023-02-22 NOTE — ED PROVIDER NOTE - WR ORDER DATE AND TIME 1
Patient ID: Mario is a 49 year old male.    Chief Complaint   Patient presents with   • Cough     DRY COUGH, HEADACHE,SORENESS     HPI   Patient states he developed a dry cough 5 days ago.  He denies fevers or chills.  He is not using inhaler.  He states another family member has similar symptoms.     has Acid reflux; Hyperlipidemia; Hypogonadism male; and Reactive airway disease without complication on their problem list.  Current Outpatient Medications   Medication Sig Dispense Refill   • albuterol (PROAIR HFA) 108 (90 Base) MCG/ACT inhaler Inhale 2 puffs into the lungs every 4 hours as needed.      • atorvastatin (LIPITOR) 10 MG tablet Take 10 mg by mouth daily.      • cyclobenzaprine (FLEXERIL) 10 MG tablet Take 10 mg by mouth 2 times daily as needed.      • pantoprazole (PROTONIX) 40 MG tablet Take by mouth daily.     • ANDROGEL PUMP 20.25 MG/ACT (1.62%) gel 20.25 mg.   5     No current facility-administered medications for this visit.      ALLERGIES:  Allergies no known allergies    Review of Systems   Constitutional: Negative for chills and fever.   Respiratory: Positive for cough and wheezing. Negative for shortness of breath.          Physical Exam   Constitutional: No distress.   HENT:   Nose: Nose normal.   Mouth/Throat: No oropharyngeal exudate.   Eyes: Conjunctivae are normal. Right eye exhibits no discharge. Left eye exhibits discharge.   Neck: Normal range of motion. Neck supple.   Pulmonary/Chest:   Bronchial breath sounds with rhonchi throughout entire left lung fields.   Lymphadenopathy:     He has no cervical adenopathy.   Vitals reviewed.      Assessment  Problem List Items Addressed This Visit        Respiratory    Reactive airway disease without complication    Acute bronchitis - Primary    Relevant Orders    XR CHEST PA AND LATERAL 2 VIEWS          Plan      Patient with bronchitis, chest x-ray PA and lateral ordered to assess for community-acquired pneumonia.  Will treat with azithromycin  and pro-air inhaler 2 puffs 3 times daily.      3 months     22-Feb-2023 08:43

## 2023-02-22 NOTE — PATIENT PROFILE ADULT - FALL HARM RISK - HARM RISK INTERVENTIONS

## 2023-02-23 ENCOUNTER — TRANSCRIPTION ENCOUNTER (OUTPATIENT)
Age: 73
End: 2023-02-23

## 2023-02-23 LAB
ALBUMIN SERPL ELPH-MCNC: 3.5 G/DL — SIGNIFICANT CHANGE UP (ref 3.3–5)
ALP SERPL-CCNC: 91 U/L — SIGNIFICANT CHANGE UP (ref 30–120)
ALT FLD-CCNC: 24 U/L DA — SIGNIFICANT CHANGE UP (ref 10–60)
ANION GAP SERPL CALC-SCNC: 10 MMOL/L — SIGNIFICANT CHANGE UP (ref 5–17)
ANION GAP SERPL CALC-SCNC: 9 MMOL/L — SIGNIFICANT CHANGE UP (ref 5–17)
AST SERPL-CCNC: 23 U/L — SIGNIFICANT CHANGE UP (ref 10–40)
BILIRUB SERPL-MCNC: 1.1 MG/DL — SIGNIFICANT CHANGE UP (ref 0.2–1.2)
BUN SERPL-MCNC: 16 MG/DL — SIGNIFICANT CHANGE UP (ref 7–23)
BUN SERPL-MCNC: 16 MG/DL — SIGNIFICANT CHANGE UP (ref 7–23)
CALCIUM SERPL-MCNC: 8.4 MG/DL — SIGNIFICANT CHANGE UP (ref 8.4–10.5)
CALCIUM SERPL-MCNC: 8.5 MG/DL — SIGNIFICANT CHANGE UP (ref 8.4–10.5)
CHLORIDE SERPL-SCNC: 103 MMOL/L — SIGNIFICANT CHANGE UP (ref 96–108)
CHLORIDE SERPL-SCNC: 104 MMOL/L — SIGNIFICANT CHANGE UP (ref 96–108)
CO2 SERPL-SCNC: 26 MMOL/L — SIGNIFICANT CHANGE UP (ref 22–31)
CO2 SERPL-SCNC: 26 MMOL/L — SIGNIFICANT CHANGE UP (ref 22–31)
CREAT SERPL-MCNC: 0.74 MG/DL — SIGNIFICANT CHANGE UP (ref 0.5–1.3)
CREAT SERPL-MCNC: 0.99 MG/DL — SIGNIFICANT CHANGE UP (ref 0.5–1.3)
EGFR: 81 ML/MIN/1.73M2 — SIGNIFICANT CHANGE UP
EGFR: 96 ML/MIN/1.73M2 — SIGNIFICANT CHANGE UP
GLUCOSE SERPL-MCNC: 125 MG/DL — HIGH (ref 70–99)
GLUCOSE SERPL-MCNC: 137 MG/DL — HIGH (ref 70–99)
HCT VFR BLD CALC: 31.4 % — LOW (ref 39–50)
HCT VFR BLD CALC: 34.3 % — LOW (ref 39–50)
HGB BLD-MCNC: 10.7 G/DL — LOW (ref 13–17)
HGB BLD-MCNC: 9.9 G/DL — LOW (ref 13–17)
MCHC RBC-ENTMCNC: 26.5 PG — LOW (ref 27–34)
MCHC RBC-ENTMCNC: 26.7 PG — LOW (ref 27–34)
MCHC RBC-ENTMCNC: 31.2 GM/DL — LOW (ref 32–36)
MCHC RBC-ENTMCNC: 31.5 GM/DL — LOW (ref 32–36)
MCV RBC AUTO: 84.6 FL — SIGNIFICANT CHANGE UP (ref 80–100)
MCV RBC AUTO: 84.9 FL — SIGNIFICANT CHANGE UP (ref 80–100)
NRBC # BLD: 0 /100 WBCS — SIGNIFICANT CHANGE UP (ref 0–0)
NRBC # BLD: 0 /100 WBCS — SIGNIFICANT CHANGE UP (ref 0–0)
PLATELET # BLD AUTO: 168 K/UL — SIGNIFICANT CHANGE UP (ref 150–400)
PLATELET # BLD AUTO: 178 K/UL — SIGNIFICANT CHANGE UP (ref 150–400)
POTASSIUM SERPL-MCNC: 3.8 MMOL/L — SIGNIFICANT CHANGE UP (ref 3.5–5.3)
POTASSIUM SERPL-MCNC: 4.1 MMOL/L — SIGNIFICANT CHANGE UP (ref 3.5–5.3)
POTASSIUM SERPL-SCNC: 3.8 MMOL/L — SIGNIFICANT CHANGE UP (ref 3.5–5.3)
POTASSIUM SERPL-SCNC: 4.1 MMOL/L — SIGNIFICANT CHANGE UP (ref 3.5–5.3)
PROT SERPL-MCNC: 6.9 G/DL — SIGNIFICANT CHANGE UP (ref 6–8.3)
RBC # BLD: 3.71 M/UL — LOW (ref 4.2–5.8)
RBC # BLD: 4.04 M/UL — LOW (ref 4.2–5.8)
RBC # FLD: 16.2 % — HIGH (ref 10.3–14.5)
RBC # FLD: 16.2 % — HIGH (ref 10.3–14.5)
SODIUM SERPL-SCNC: 139 MMOL/L — SIGNIFICANT CHANGE UP (ref 135–145)
SODIUM SERPL-SCNC: 139 MMOL/L — SIGNIFICANT CHANGE UP (ref 135–145)
WBC # BLD: 7.84 K/UL — SIGNIFICANT CHANGE UP (ref 3.8–10.5)
WBC # BLD: 8.64 K/UL — SIGNIFICANT CHANGE UP (ref 3.8–10.5)
WBC # FLD AUTO: 7.84 K/UL — SIGNIFICANT CHANGE UP (ref 3.8–10.5)
WBC # FLD AUTO: 8.64 K/UL — SIGNIFICANT CHANGE UP (ref 3.8–10.5)

## 2023-02-23 PROCEDURE — 99233 SBSQ HOSP IP/OBS HIGH 50: CPT

## 2023-02-23 PROCEDURE — 27130 TOTAL HIP ARTHROPLASTY: CPT | Mod: AS,LT

## 2023-02-23 PROCEDURE — 27130 TOTAL HIP ARTHROPLASTY: CPT | Mod: LT

## 2023-02-23 DEVICE — IMPLANTABLE DEVICE: Type: IMPLANTABLE DEVICE | Site: LEFT | Status: FUNCTIONAL

## 2023-02-23 DEVICE — K-WIRE MICROAIRE (THREADED) SINGLE TROCAR 4.8MM X 9": Type: IMPLANTABLE DEVICE | Site: LEFT | Status: FUNCTIONAL

## 2023-02-23 DEVICE — SLEEVE BIOLOX DELTA OPTION SZ -3: Type: IMPLANTABLE DEVICE | Site: LEFT | Status: FUNCTIONAL

## 2023-02-23 DEVICE — CUP ACET EMPOWR CLUSTER 54MM ALPHA G: Type: IMPLANTABLE DEVICE | Site: LEFT | Status: FUNCTIONAL

## 2023-02-23 DEVICE — HEAD FEM OPTION CERAMIC DELTA 36MM: Type: IMPLANTABLE DEVICE | Site: LEFT | Status: FUNCTIONAL

## 2023-02-23 DEVICE — SCREW 6.5MM X 25MM: Type: IMPLANTABLE DEVICE | Site: LEFT | Status: FUNCTIONAL

## 2023-02-23 DEVICE — LINER ACET EMPOWR HXE PLUS NEUT 36MM ALPHA G: Type: IMPLANTABLE DEVICE | Site: LEFT | Status: FUNCTIONAL

## 2023-02-23 RX ORDER — ACETAMINOPHEN 500 MG
1000 TABLET ORAL ONCE
Refills: 0 | Status: COMPLETED | OUTPATIENT
Start: 2023-02-23 | End: 2023-02-23

## 2023-02-23 RX ORDER — SIMVASTATIN 20 MG/1
20 TABLET, FILM COATED ORAL AT BEDTIME
Refills: 0 | Status: DISCONTINUED | OUTPATIENT
Start: 2023-02-23 | End: 2023-02-24

## 2023-02-23 RX ORDER — HYDROMORPHONE HYDROCHLORIDE 2 MG/ML
0.5 INJECTION INTRAMUSCULAR; INTRAVENOUS; SUBCUTANEOUS
Refills: 0 | Status: DISCONTINUED | OUTPATIENT
Start: 2023-02-23 | End: 2023-02-24

## 2023-02-23 RX ORDER — SODIUM CHLORIDE 9 MG/ML
1000 INJECTION, SOLUTION INTRAVENOUS
Refills: 0 | Status: DISCONTINUED | OUTPATIENT
Start: 2023-02-23 | End: 2023-02-23

## 2023-02-23 RX ORDER — METHOCARBAMOL 500 MG/1
1 TABLET, FILM COATED ORAL
Qty: 14 | Refills: 0
Start: 2023-02-23 | End: 2023-03-01

## 2023-02-23 RX ORDER — SODIUM CHLORIDE 9 MG/ML
500 INJECTION INTRAMUSCULAR; INTRAVENOUS; SUBCUTANEOUS ONCE
Refills: 0 | Status: COMPLETED | OUTPATIENT
Start: 2023-02-23 | End: 2023-02-23

## 2023-02-23 RX ORDER — PANTOPRAZOLE SODIUM 20 MG/1
40 TABLET, DELAYED RELEASE ORAL EVERY 12 HOURS
Refills: 0 | Status: DISCONTINUED | OUTPATIENT
Start: 2023-02-23 | End: 2023-02-24

## 2023-02-23 RX ORDER — TRANEXAMIC ACID 100 MG/ML
1000 INJECTION, SOLUTION INTRAVENOUS ONCE
Refills: 0 | Status: COMPLETED | OUTPATIENT
Start: 2023-02-23 | End: 2023-02-23

## 2023-02-23 RX ORDER — ACETAMINOPHEN 500 MG
1000 TABLET ORAL EVERY 8 HOURS
Refills: 0 | Status: DISCONTINUED | OUTPATIENT
Start: 2023-02-23 | End: 2023-02-24

## 2023-02-23 RX ORDER — APIXABAN 2.5 MG/1
1 TABLET, FILM COATED ORAL
Qty: 55 | Refills: 0
Start: 2023-02-23

## 2023-02-23 RX ORDER — HYDROMORPHONE HYDROCHLORIDE 2 MG/ML
1 INJECTION INTRAMUSCULAR; INTRAVENOUS; SUBCUTANEOUS
Refills: 0 | Status: DISCONTINUED | OUTPATIENT
Start: 2023-02-23 | End: 2023-02-23

## 2023-02-23 RX ORDER — MAGNESIUM HYDROXIDE 400 MG/1
30 TABLET, CHEWABLE ORAL DAILY
Refills: 0 | Status: DISCONTINUED | OUTPATIENT
Start: 2023-02-23 | End: 2023-02-24

## 2023-02-23 RX ORDER — DEXAMETHASONE 0.5 MG/5ML
8 ELIXIR ORAL ONCE
Refills: 0 | Status: COMPLETED | OUTPATIENT
Start: 2023-02-24 | End: 2023-02-24

## 2023-02-23 RX ORDER — POLYETHYLENE GLYCOL 3350 17 G/17G
17 POWDER, FOR SOLUTION ORAL AT BEDTIME
Refills: 0 | Status: DISCONTINUED | OUTPATIENT
Start: 2023-02-23 | End: 2023-02-24

## 2023-02-23 RX ORDER — HYDROMORPHONE HYDROCHLORIDE 2 MG/ML
0.5 INJECTION INTRAMUSCULAR; INTRAVENOUS; SUBCUTANEOUS
Refills: 0 | Status: DISCONTINUED | OUTPATIENT
Start: 2023-02-23 | End: 2023-02-23

## 2023-02-23 RX ORDER — APIXABAN 2.5 MG/1
2.5 TABLET, FILM COATED ORAL EVERY 12 HOURS
Refills: 0 | Status: DISCONTINUED | OUTPATIENT
Start: 2023-02-24 | End: 2023-02-24

## 2023-02-23 RX ORDER — CELECOXIB 200 MG/1
100 CAPSULE ORAL EVERY 12 HOURS
Refills: 0 | Status: DISCONTINUED | OUTPATIENT
Start: 2023-02-24 | End: 2023-02-24

## 2023-02-23 RX ORDER — SODIUM CHLORIDE 9 MG/ML
1000 INJECTION, SOLUTION INTRAVENOUS
Refills: 0 | Status: DISCONTINUED | OUTPATIENT
Start: 2023-02-24 | End: 2023-02-24

## 2023-02-23 RX ORDER — APREPITANT 80 MG/1
40 CAPSULE ORAL ONCE
Refills: 0 | Status: COMPLETED | OUTPATIENT
Start: 2023-02-23 | End: 2023-02-23

## 2023-02-23 RX ORDER — ONDANSETRON 8 MG/1
4 TABLET, FILM COATED ORAL EVERY 6 HOURS
Refills: 0 | Status: DISCONTINUED | OUTPATIENT
Start: 2023-02-23 | End: 2023-02-24

## 2023-02-23 RX ORDER — METHOCARBAMOL 500 MG/1
500 TABLET, FILM COATED ORAL EVERY 12 HOURS
Refills: 0 | Status: DISCONTINUED | OUTPATIENT
Start: 2023-02-23 | End: 2023-02-24

## 2023-02-23 RX ORDER — OXYCODONE HYDROCHLORIDE 5 MG/1
5 TABLET ORAL
Refills: 0 | Status: DISCONTINUED | OUTPATIENT
Start: 2023-02-23 | End: 2023-02-24

## 2023-02-23 RX ORDER — CHLORHEXIDINE GLUCONATE 213 G/1000ML
1 SOLUTION TOPICAL ONCE
Refills: 0 | Status: COMPLETED | OUTPATIENT
Start: 2023-02-23 | End: 2023-02-23

## 2023-02-23 RX ORDER — CELECOXIB 200 MG/1
1 CAPSULE ORAL
Qty: 5 | Refills: 0
Start: 2023-02-23

## 2023-02-23 RX ORDER — OXYCODONE HYDROCHLORIDE 5 MG/1
10 TABLET ORAL
Refills: 0 | Status: DISCONTINUED | OUTPATIENT
Start: 2023-02-23 | End: 2023-02-24

## 2023-02-23 RX ORDER — CEFAZOLIN SODIUM 1 G
2000 VIAL (EA) INJECTION EVERY 8 HOURS
Refills: 0 | Status: COMPLETED | OUTPATIENT
Start: 2023-02-23 | End: 2023-02-23

## 2023-02-23 RX ORDER — SENNA PLUS 8.6 MG/1
2 TABLET ORAL AT BEDTIME
Refills: 0 | Status: DISCONTINUED | OUTPATIENT
Start: 2023-02-23 | End: 2023-02-24

## 2023-02-23 RX ORDER — ONDANSETRON 8 MG/1
4 TABLET, FILM COATED ORAL ONCE
Refills: 0 | Status: DISCONTINUED | OUTPATIENT
Start: 2023-02-23 | End: 2023-02-23

## 2023-02-23 RX ORDER — CEFAZOLIN SODIUM 1 G
2000 VIAL (EA) INJECTION ONCE
Refills: 0 | Status: COMPLETED | OUTPATIENT
Start: 2023-02-23 | End: 2023-02-23

## 2023-02-23 RX ADMIN — SIMVASTATIN 20 MILLIGRAM(S): 20 TABLET, FILM COATED ORAL at 21:16

## 2023-02-23 RX ADMIN — APREPITANT 40 MILLIGRAM(S): 80 CAPSULE ORAL at 07:17

## 2023-02-23 RX ADMIN — Medication 1000 MILLIGRAM(S): at 16:47

## 2023-02-23 RX ADMIN — Medication 100 MILLIGRAM(S): at 23:59

## 2023-02-23 RX ADMIN — Medication 1000 MILLIGRAM(S): at 21:16

## 2023-02-23 RX ADMIN — MORPHINE SULFATE 2 MILLIGRAM(S): 50 CAPSULE, EXTENDED RELEASE ORAL at 03:20

## 2023-02-23 RX ADMIN — Medication 400 MILLIGRAM(S): at 15:08

## 2023-02-23 RX ADMIN — MORPHINE SULFATE 2 MILLIGRAM(S): 50 CAPSULE, EXTENDED RELEASE ORAL at 03:00

## 2023-02-23 RX ADMIN — SODIUM CHLORIDE 500 MILLILITER(S): 9 INJECTION INTRAMUSCULAR; INTRAVENOUS; SUBCUTANEOUS at 11:52

## 2023-02-23 RX ADMIN — CHLORHEXIDINE GLUCONATE 1 APPLICATION(S): 213 SOLUTION TOPICAL at 07:18

## 2023-02-23 RX ADMIN — PANTOPRAZOLE SODIUM 40 MILLIGRAM(S): 20 TABLET, DELAYED RELEASE ORAL at 18:02

## 2023-02-23 RX ADMIN — Medication 100 MILLIGRAM(S): at 16:38

## 2023-02-23 RX ADMIN — ONDANSETRON 4 MILLIGRAM(S): 8 TABLET, FILM COATED ORAL at 23:59

## 2023-02-23 RX ADMIN — SODIUM CHLORIDE 50 MILLILITER(S): 9 INJECTION, SOLUTION INTRAVENOUS at 05:27

## 2023-02-23 RX ADMIN — SODIUM CHLORIDE 500 MILLILITER(S): 9 INJECTION INTRAMUSCULAR; INTRAVENOUS; SUBCUTANEOUS at 15:08

## 2023-02-23 RX ADMIN — Medication 1000 MILLIGRAM(S): at 21:27

## 2023-02-23 NOTE — PROGRESS NOTE ADULT - SUBJECTIVE AND OBJECTIVE BOX
Patient is a 73y old  Male who presents with a chief complaint of     INTERVAL HPI/OVERNIGHT EVENTS:    no overnight events  plan for OR today    MEDICATIONS  (STANDING):  lactated ringers. 1000 milliLiter(s) (75 mL/Hr) IV Continuous <Continuous>  simvastatin 20 milliGRAM(s) Oral at bedtime    MEDICATIONS  (PRN):  aluminum hydroxide/magnesium hydroxide/simethicone Suspension 30 milliLiter(s) Oral every 4 hours PRN Dyspepsia  HYDROmorphone  Injectable 0.5 milliGRAM(s) IV Push every 10 minutes PRN Moderate Pain (4 - 6)  HYDROmorphone  Injectable 1 milliGRAM(s) IV Push every 10 minutes PRN Severe Pain (7 - 10)  melatonin 3 milliGRAM(s) Oral at bedtime PRN Insomnia  ondansetron Injectable 4 milliGRAM(s) IV Push once PRN Nausea and/or Vomiting  ondansetron Injectable 4 milliGRAM(s) IV Push every 8 hours PRN Nausea and/or Vomiting      Allergies    latex (Rash)  No Known Drug Allergies    Intolerances        REVIEW OF SYSTEMS:  as above    Vital Signs Last 24 Hrs  T(C): 36.8 (23 Feb 2023 11:10), Max: 37.1 (22 Feb 2023 23:45)  T(F): 98.2 (23 Feb 2023 11:10), Max: 98.7 (22 Feb 2023 23:45)  HR: 88 (23 Feb 2023 11:55) (63 - 92)  BP: 122/75 (23 Feb 2023 11:55) (105/62 - 145/95)  BP(mean): --  RR: 14 (23 Feb 2023 11:55) (14 - 18)  SpO2: 99% (23 Feb 2023 11:55) (92% - 99%)    Parameters below as of 23 Feb 2023 11:10  Patient On (Oxygen Delivery Method): nasal cannula  O2 Flow (L/min): 3      PHYSICAL EXAM:  GENERAL: NAD, well-groomed, well-developed  HEAD:  Atraumatic, Normocephalic  EYES: EOMI, PERRLA, conjunctiva and sclera clear  ENMT: No tonsillar erythema, exudates, or enlargement; Moist mucous membranes  NECK: Supple, No JVD, Normal thyroid  CHEST/LUNG: Clear to percussion bilaterally; No rales, rhonchi, wheezing, or rubs  HEART: Regular rate and rhythm; No murmurs, rubs, or gallops  ABDOMEN: Soft, Nontender, Nondistended; Bowel sounds present  EXTREMITIES:  2+ Peripheral Pulses, No clubbing, cyanosis, or edema  LEFT LE:  Leg shortened and externally rotated.  Scab noted over lateral ankle, otherwise skin intact  NERVOUS SYSTEM:  Alert & Oriented X3, Good concentration;   SKIN: No rashes or lesions    LABS:                        10.7   7.84  )-----------( 178      ( 23 Feb 2023 06:00 )             34.3     23 Feb 2023 06:00    139    |  103    |  16     ----------------------------<  125    3.8     |  26     |  0.74     Ca    8.5        23 Feb 2023 06:00    TPro  6.9    /  Alb  3.5    /  TBili  1.1    /  DBili  x      /  AST  23     /  ALT  24     /  AlkPhos  91     23 Feb 2023 06:00    PT/INR - ( 22 Feb 2023 09:00 )   PT: 13.1 sec;   INR: 1.12 ratio         PTT - ( 22 Feb 2023 09:00 )  PTT:26.9 sec    CAPILLARY BLOOD GLUCOSE          RADIOLOGY & ADDITIONAL TESTS:

## 2023-02-23 NOTE — OCCUPATIONAL THERAPY INITIAL EVALUATION ADULT - GENERAL OBSERVATIONS, REHAB EVAL
Pt found supine in bed +SCDs, IV, BORIS wound dressing, LE wedges for positioning, NCO2 Pt found supine in bed +SCDs, IV, BORIS wound dressing

## 2023-02-23 NOTE — PROGRESS NOTE ADULT - SUBJECTIVE AND OBJECTIVE BOX
The discussion regarding options for nonoperative and operative management was introduced through a patient shared decision making protocol. The benefits of surgery versus the risks were discussed with the patient.  Risks of surgery include medical complications of anesthesia, DVT, pulmonary embolism, hardware complications, need for revision surgery, infection, bleeding, need for transfusion, neurovascular injury, dislocation, fracture, chronic pain, stiffness and scarring, and limb length discrepancy were addressed with the patient. The patient appeared to understand the ramifications of surgery and had ample time for questions, then consenting for a left total hip replacement.

## 2023-02-23 NOTE — PRE-OP CHECKLIST - NSWEIGHTCALCTOOLDRUG_GEN_A_CORE
Awaiting EEG results  Will start keppra 500 mg PO BID  Seizure precautions  Further recommendations per MD      used

## 2023-02-23 NOTE — DISCHARGE NOTE PROVIDER - NSDCMRMEDTOKEN_GEN_ALL_CORE_FT
acetaminophen 500 mg oral tablet: 2 tab(s) orally 3 times a day for the next 14 days.  CeleBREX 100 mg oral capsule: 1 cap(s) orally 2 times a day.  MDD:2  Eliquis 2.5 mg oral tablet: 1 tab(s) orally 2 times a day. MDD:2  ferrous sulfate 325 mg (65 mg elemental iron) oral tablet: 1 tab(s) orally once a day   methocarbamol 500 mg oral tablet: 1 tab(s) orally every 12 hours  as needed for muscle spasms.   pantoprazole 40 mg oral delayed release tablet: 1 tab(s) orally 2 times a day   Senna 8.6 mg oral tablet: 2 tab(s) orally once a day (at bedtime), As Needed for constipation  Vytorin 10 mg-20 mg oral tablet: 1 tab(s) orally once a day   acetaminophen 500 mg oral tablet: 2 tab(s) orally every 8 hours  CeleBREX 100 mg oral capsule: 1 cap(s) orally 2 times a day.  MDD:2  Eliquis 2.5 mg oral tablet: 1 tab(s) orally 2 times a day. MDD:2  ferrous sulfate 325 mg (65 mg elemental iron) oral tablet: 1 tab(s) orally once a day   methocarbamol 500 mg oral tablet: 1 tab(s) orally every 12 hours  as needed for muscle spasms.   omeprazole 20 mg oral delayed release capsule: 1 cap(s) orally once a day  before breakfast   oxyCODONE 5 mg oral tablet: 1-2 tab(s) orally every 4 hours, As Needed -Moderate to severe Pain MDD:6  [id number]  polyethylene glycol 3350 oral powder for reconstitution: 17 gram(s) orally once a day (at bedtime)  Senna 8.6 mg oral tablet: 2 tab(s) orally once a day (at bedtime), As Needed for constipation  Vytorin 10 mg-20 mg oral tablet: 1 tab(s) orally once a day   acetaminophen 500 mg oral tablet: 2 tab(s) orally every 8 hours  cefadroxil 500 mg oral capsule: 1 cap(s) orally 2 times a day   CeleBREX 100 mg oral capsule: 1 cap(s) orally 2 times a day.  MDD:2  Eliquis 2.5 mg oral tablet: 1 tab(s) orally 2 times a day. MDD:2  famotidine 40 mg oral tablet: 1 tab(s) orally 2 times a day   ferrous sulfate 325 mg (65 mg elemental iron) oral tablet: 1 tab(s) orally once a day   methocarbamol 500 mg oral tablet: 1 tab(s) orally every 12 hours  as needed for muscle spasms.   oxyCODONE 5 mg oral tablet: 1-2 tab(s) orally every 4 hours, As Needed -Moderate to severe Pain MDD:6  [id number]  polyethylene glycol 3350 oral powder for reconstitution: 17 gram(s) orally once a day (at bedtime)  Senna 8.6 mg oral tablet: 2 tab(s) orally once a day (at bedtime), As Needed for constipation  Vytorin 10 mg-20 mg oral tablet: 1 tab(s) orally once a day

## 2023-02-23 NOTE — DISCHARGE NOTE PROVIDER - NSDCFUADDAPPT_GEN_ALL_CORE_FT
Call Dr Redmond's office for 2 week post op visit  It is advisable to follow up w/ your pcp in 2-3 weeks to be sure there are no underlying problems.

## 2023-02-23 NOTE — OCCUPATIONAL THERAPY INITIAL EVALUATION ADULT - PERTINENT HX OF CURRENT PROBLEM, REHAB EVAL
Left Anterior THR  Pt admitted due to left hip pain from fall at home 2/22/23 Patient states he was going out to get the paper and slipped and fell onto his left hip

## 2023-02-23 NOTE — DISCHARGE NOTE PROVIDER - HOSPITAL COURSE
The patient is a 73 y.o. male with a left femoral neck fracture sustained in a fall at home.  He also has osteoarthritis of the left hip.  After admission on Feburary 22, 2023 and receiving pre-operative medicla clearance, parenteral prophylactic antibiotics, the patient  underwent an  uncomplicated left anterior total hip  replacement by orthopedic surgeon Dr. Cleveland Redmond.    A medical consultation from the Hospitalist service was obtained for post-operative medical co-management. Typical Physical & occupational therapy modalities post left anterior total hip replacement were performed including ambulation training, range of motion, ADL's, and transfers. Eliquis was given for DVT prophylaxis.  The patient had a clean appearing surgical dressing with no sign of surgical site infections and had a stable neuro / vascular exam of the operated extremity.  After progression of mobility guided by the PT/ OT staff,  the patient was felt to benefit from further rehabilitative care to increase their level of function. This was felt to best be accomplished in a home setting.  Discharge and Orthopedic Care instructions were delineated in the Discharge Plan and reviewed with the patient. All medications were delineated in the medication reconciliation tool and key points were reviewed with the patient. He was deemed stable from an Orthopedic & medical standpoint for discharge today.  Upon completion of Home care he will be  following up with Dr. Redmond for office  follow up Orthopedic care.   The patient is a 73 y.o. male with a left femoral neck fracture sustained in a fall at home.  He also has osteoarthritis of the left hip.  After admission on Feburary 22, 2023 and receiving pre-operative medical clearance, parenteral prophylactic antibiotics, the patient  underwent an  uncomplicated left anterior total hip  replacement by orthopedic surgeon Dr. Cleveland Redmond.    A medical consultation from the Hospitalist service was obtained for post-operative medical co-management. Typical Physical & occupational therapy modalities post left anterior total hip replacement were performed including ambulation training, range of motion, ADL's, and transfers. Eliquis 2.5 every 12 hrs, along w/ bilat venodynes was given for DVT prophylaxis.  The patient had a clean appearing surgical dressing with no sign of surgical site infections and had a stable neuro / vascular exam of the operated extremity.  After progression of mobility guided by the PT/ OT staff,  the patient was felt to benefit from further rehabilitative care to increase their level of function. This was felt to best be accomplished in a home setting.  Discharge and Orthopedic Care instructions were delineated in the Discharge Plan and reviewed with the patient. All medications were delineated in the medication reconciliation tool and key points were reviewed with the patient. He was deemed stable from an Orthopedic & medical standpoint for discharge today.  Upon completion of Home care he will be  following up with Dr. Redmond for office  follow up Orthopedic care.

## 2023-02-23 NOTE — PROGRESS NOTE ADULT - ASSESSMENT
73y Male with Patient with PMHx HLD is admitted for left hip fx    #Left hip fx  GMF  Ortho preop and post op orders  EKG reviewed  No major cardiac hx  Medically optimized for procedure  NPO    #HLD  on vytorin at home  will use simvistatin     #DVT Proph  heparin  held morning dose

## 2023-02-23 NOTE — DISCHARGE NOTE PROVIDER - NSDCFUADDINST_GEN_ALL_CORE_FT
Your new total hip replacement requires proper care.  Your surgical care provider is your best resource for information.  Physical Therapy/Occupational Therapy for: Ambulation, transfers, stairs, & ADLs, isometrics.  Full weight bearing on both legs; Walker/cane use as instructed by Physical therapy/Occupational therapy.  Anterior Total Hip Replacement precautions for  6 weeks:  - No straight leg raise;  - No external rotation of hip when extended-standing or lying flat; No hyperextension of hip when standing (kickback).  - Use pain medication if needed as prescribed.  Ice packs are a helpful addition to your comfort.  Applying a  waterproof ice pack over a cloth or thin towel to the site for 30 minutes per hour may provide benefit by reducing swelling and discomfort.  - Keep incision clean and dry.   -Take short, frequent walks increasing the distance that you walk each day as tolerated.  - Change your position every hour to decrease pain and stiffness.  - Continue the exercises taught to you by your physical therapist.  - No driving until cleared by the doctor.  - No tub baths, hot tubs, or swimming pools until instructed by your doctor.  Steristrip removal on/near after Post Op Day # 14 in your Surgeon's office.    Mepilex dressing is over your hip wound.  It is waterproof and you may shower.  Do not aim shower stream at surgical site and pat dry with clean towel after showering.  Remove Mepilex dressing 7 days after surgery and leave wound open to air.    Do not resume driving until you have your surgeon’s permission.           For Constipation :   • Increase your water intake. Drink at least 8 glasses of water daily.  • Try adding fiber to your diet by eating fruits, vegetables and foods that are rich in grains.  • If you do experience constipation, you may take an over-the-counter stool softener/laxative such as Mercy Colace, Senekot or  Milk of Magnesia.          - Call your doctor if you experience:  • An increase in pain not controlled by pain medication or change in activity or  position.  • Temperature greater than 101° F.  • Redness, increased swelling or foul smelling drainage from or around the  incision.  • Numbness, tingling or a change in color or temperature of the operative leg.  • Call your doctor immediately if you experience chest pain, shortness of breath or calf pain.

## 2023-02-23 NOTE — PHYSICAL THERAPY INITIAL EVALUATION ADULT - PERTINENT HX OF CURRENT PROBLEM, REHAB EVAL
Pt is a 72 y/o male with left hip femoral neck fracture. Pt is s/p left total hip replacement anterior approach on 2/23/23.

## 2023-02-23 NOTE — OCCUPATIONAL THERAPY INITIAL EVALUATION ADULT - ADDITIONAL COMMENTS
Pt lives in a split level home Pt lives in a split level home 2 AMBER + railing 2 steps in living room and flight to bedroom  +stall  Pt has 2 children that are involved in his care.

## 2023-02-23 NOTE — PHYSICAL THERAPY INITIAL EVALUATION ADULT - ADDITIONAL COMMENTS
Pt lives with wife in a private home, there are 2+3 stairs +HR to enter and a flight of stairs +HR to the primary bedroom/bathroom. Pt has a walk in shower. PTA pt was independent with ADLs and ambulation. Pt reports his wife had a stroke and has a 24/7 aide, pt reports he has good family support from his daughter and son.

## 2023-02-23 NOTE — PRE-OP CHECKLIST - SKIN PREP
Patient is identified as having Diastolic (HFpEF) heart failure that is Chronic. CHF is currently controlled. Latest ECHO performed and demonstrates- Results for orders placed during the hospital encounter of 10/19/20    Echo Color Flow Doppler? Yes; Bubble Contrast? No    Interpretation Summary  · There is mild left ventricular concentric hypertrophy.  · The left ventricle is normal in size with normal systolic function. The estimated ejection fraction is 65%.  · Atrial fibrillation observed with restrictive filling pattern present.  · With normal left atrial pressure.  · Normal right ventricular systolic function.  · Moderate left atrial enlargement.  · Mild right atrial enlargement.  · Moderate aortic valve stenosis.  · Aortic valve area is 1.63 cm2; peak velocity is 1.76 m/s; mean gradient is 7 mmHg.  · Moderate mitral regurgitation.  · No pulmonary hypertension  · Mild tricuspid regurgitation.  · Mild pulmonic regurgitation.  · Normal central venous pressure (3 mmHg).  · The estimated PA systolic pressure is 23 mmHg.    Left ventricle hypertrophy, moderate calcific aortic valve stenosis plainimetered d valve area is 1.6 centimeters squared  Plus two mitral regurgitation  No pulmonary hypertension  . Continue Beta Blocker Furosemide and monitor clinical status closely. Monitor on telemetry. Patient is off CHF pathway.  Monitor strict Is&Os and daily weights.  Place on fluid restriction of 2 L. Continue to stress to patient importance of self efficacy and  on diet for CHF. Last BNP reviewed- and noted below No results for input(s): BNP, BNPTRIAGEBLO in the last 168 hours..           done

## 2023-02-23 NOTE — PHYSICAL THERAPY INITIAL EVALUATION ADULT - RANGE OF MOTION EXAMINATION, REHAB EVAL
left hip not assessed due to surgery/bilateral upper extremity ROM was WFL (within functional limits)/bilateral lower extremity ROM was WFL (within functional limits)

## 2023-02-23 NOTE — DISCHARGE NOTE PROVIDER - NSDCCPCAREPLAN_GEN_ALL_CORE_FT
PRINCIPAL DISCHARGE DIAGNOSIS  Diagnosis: Fracture of unspecified part of neck of left femur, initial encounter for closed fracture  Assessment and Plan of Treatment: Community Status: Active       PRINCIPAL DISCHARGE DIAGNOSIS  Diagnosis: Fracture of unspecified part of neck of left femur, initial encounter for closed fracture  Assessment and Plan of Treatment: Community Status: Active  left hip hemiarthroplasty  Physical Therapy/Occupational Therapy for: Ambulation, transfers, stairs, & ADLs, isometrics.   Full weight bearing on both legs; Walker/cane use as instructed by Physical therapy/Occupational therapy. Anterior Total Hip Replacement precautions for  6 weeks: No straight leg raise; No external rotation of hip when extended-standing or lying flat; No hyperextension of hip when standing (kickback).   Ice packs to hip for 30 min. every 3 hours and after physical therapy.   Keep incision clean and dry. You have a mepilex dressing. It is water resistant and may get slightly wet in the shower.  Remove dressing in 7 days and leave wound open to air.  Wound may get wet in the shower as long as there is no drainage from   Steristrip removal on/near after Post Op Day # 14 in Surgeon's office.  • Do not take a tub bath yet.   • Do not resume driving until you have your surgeon’s permission.  Opioid safety :  Do not keep opioid in the medicine cabinet in bathroom where others may have access to it.  Do not drive while taking opioid.  To dispose of it some pharmacies do have drop boxes as do police stations.  Do not flush or put in trash.

## 2023-02-23 NOTE — BRIEF OPERATIVE NOTE - NSICDXBRIEFPROCEDURE_GEN_ALL_CORE_FT
PROCEDURES:  Total replacement of left hip joint by anterior approach 23-Feb-2023 10:46:59  Pawel Arreaga   PROCEDURES:  Total replacement of left hip joint by anterior approach 23-Feb-2023 10:46:59 Left Pawel Arreaga

## 2023-02-23 NOTE — PROGRESS NOTE ADULT - SUBJECTIVE AND OBJECTIVE BOX
SILVESTRE RACHEL [id number]    Pt is a 73y year old Male s/p left THR. States he has no pain. Tolerating regular diet, (+) voids.  Denies chest pain/shortness of breath/nausea/vomitting.     Vital Signs Last 24 Hrs  T(C): 36.7 (23 Feb 2023 14:19), Max: 37.1 (22 Feb 2023 23:45)  T(F): 98 (23 Feb 2023 14:19), Max: 98.7 (22 Feb 2023 23:45)  HR: 93 (23 Feb 2023 14:19) (82 - 93)  BP: 140/84 (23 Feb 2023 14:19) (105/62 - 146/87)  BP(mean): --  RR: 20 (23 Feb 2023 14:19) (14 - 20)  SpO2: 93% (23 Feb 2023 14:19) (92% - 99%)    Parameters below as of 23 Feb 2023 14:19  Patient On (Oxygen Delivery Method): room air          02-22 @ 07:01  -  02-23 @ 07:00  --------------------------------------------------------  IN: 0 mL / OUT: 100 mL / NET: -100 mL                              10.7   7.84  )-----------( 178      ( 23 Feb 2023 06:00 )             34.3     02-23    139  |  103  |  16  ----------------------------<  125<H>  3.8   |  26  |  0.74    Ca    8.5      23 Feb 2023 06:00    TPro  6.9  /  Alb  3.5  /  TBili  1.1  /  DBili  x   /  AST  23  /  ALT  24  /  AlkPhos  91  02-23        PE:   LLE: Mepilex dressing CDI, Sensation intact to light touch distally, (+2) DP pulses, EHL/FHL/TA intact, Capillary refill < 2 seconds. Negative calf tenderness. PAS on. Long DENAE stockings on.    A: 73y year old Male s/p left THR     Plan:   -DVT ppx = PAS + Eliquis   -PT/OT = OOB, Anterior THR protocols  -Anterior Hip dislocation precautions  -Pain control excellent at this time, pain regimen explained to patient  -Medicine to follow   -Continue to Follow Labs  -Incentive spirometry  -Plan for discharge to home tomorrow

## 2023-02-23 NOTE — DISCHARGE NOTE PROVIDER - NSDCHHASSISTDEVIC_GEN_ALL_CORE
Walker Composite Graft Text: The defect edges were debeveled with a #15 scalpel blade.  Given the location of the defect, shape of the defect, the proximity to free margins and the fact the defect was full thickness a composite graft was deemed most appropriate.  The defect was outline and then transferred to the donor site.  A full thickness graft was then excised from the donor site. The graft was then placed in the primary defect, oriented appropriately and then sutured into place.  The secondary defect was then repaired using a primary closure.

## 2023-02-23 NOTE — DISCHARGE NOTE PROVIDER - CARE PROVIDER_API CALL
Cleveland Redmond)  Orthopedics  [address]  [place], [place] [id number]  Phone: [phone]  Fax: [phone]  Follow Up Time: 2 weeks

## 2023-02-23 NOTE — PRE-OP CHECKLIST - SELECT TESTS ORDERED
BMP/CBC/CMP/PT/PTT/INR/Hepatic Function/Type and Cross/Type and Screen/EKG/CXR/Results in MD note/COVID-19

## 2023-02-23 NOTE — DISCHARGE NOTE PROVIDER - NSDCCPTREATMENT_GEN_ALL_CORE_FT
PRINCIPAL PROCEDURE  Procedure: Total hip replacement  Findings and Treatment: Left anterior       PRINCIPAL PROCEDURE  Procedure: Arthroplasty of left hip by anterior approach  Findings and Treatment: left femoral neck fx and osteoarthritis

## 2023-02-23 NOTE — DISCHARGE NOTE PROVIDER - INSTRUCTIONS
For Constipation :   • Increase your water intake. Drink at least 8 glasses of water daily.  • Try adding fiber to your diet by eating fruits, vegetables and foods that are rich in grains.  • If you do experience constipation, you may take an over-the-counter stool softener/laxative such as Mercy Colace, Senekot, miralax or  Milk of Magnesia.

## 2023-02-24 ENCOUNTER — TRANSCRIPTION ENCOUNTER (OUTPATIENT)
Age: 73
End: 2023-02-24

## 2023-02-24 VITALS
TEMPERATURE: 100 F | HEART RATE: 98 BPM | RESPIRATION RATE: 18 BRPM | OXYGEN SATURATION: 98 % | DIASTOLIC BLOOD PRESSURE: 82 MMHG | SYSTOLIC BLOOD PRESSURE: 139 MMHG

## 2023-02-24 LAB
ANION GAP SERPL CALC-SCNC: 7 MMOL/L — SIGNIFICANT CHANGE UP (ref 5–17)
BUN SERPL-MCNC: 18 MG/DL — SIGNIFICANT CHANGE UP (ref 7–23)
CALCIUM SERPL-MCNC: 7.9 MG/DL — LOW (ref 8.4–10.5)
CHLORIDE SERPL-SCNC: 104 MMOL/L — SIGNIFICANT CHANGE UP (ref 96–108)
CO2 SERPL-SCNC: 28 MMOL/L — SIGNIFICANT CHANGE UP (ref 22–31)
CREAT SERPL-MCNC: 0.97 MG/DL — SIGNIFICANT CHANGE UP (ref 0.5–1.3)
EGFR: 83 ML/MIN/1.73M2 — SIGNIFICANT CHANGE UP
GLUCOSE SERPL-MCNC: 116 MG/DL — HIGH (ref 70–99)
HCT VFR BLD CALC: 26.6 % — LOW (ref 39–50)
HCT VFR BLD CALC: 29.4 % — LOW (ref 39–50)
HGB BLD-MCNC: 8.4 G/DL — LOW (ref 13–17)
HGB BLD-MCNC: 9.3 G/DL — LOW (ref 13–17)
MCHC RBC-ENTMCNC: 27 PG — SIGNIFICANT CHANGE UP (ref 27–34)
MCHC RBC-ENTMCNC: 27 PG — SIGNIFICANT CHANGE UP (ref 27–34)
MCHC RBC-ENTMCNC: 31.6 GM/DL — LOW (ref 32–36)
MCHC RBC-ENTMCNC: 31.6 GM/DL — LOW (ref 32–36)
MCV RBC AUTO: 85.2 FL — SIGNIFICANT CHANGE UP (ref 80–100)
MCV RBC AUTO: 85.5 FL — SIGNIFICANT CHANGE UP (ref 80–100)
NRBC # BLD: 0 /100 WBCS — SIGNIFICANT CHANGE UP (ref 0–0)
NRBC # BLD: 0 /100 WBCS — SIGNIFICANT CHANGE UP (ref 0–0)
PLATELET # BLD AUTO: 143 K/UL — LOW (ref 150–400)
PLATELET # BLD AUTO: 165 K/UL — SIGNIFICANT CHANGE UP (ref 150–400)
POTASSIUM SERPL-MCNC: 3.7 MMOL/L — SIGNIFICANT CHANGE UP (ref 3.5–5.3)
POTASSIUM SERPL-SCNC: 3.7 MMOL/L — SIGNIFICANT CHANGE UP (ref 3.5–5.3)
RBC # BLD: 3.11 M/UL — LOW (ref 4.2–5.8)
RBC # BLD: 3.45 M/UL — LOW (ref 4.2–5.8)
RBC # FLD: 16.5 % — HIGH (ref 10.3–14.5)
RBC # FLD: 16.6 % — HIGH (ref 10.3–14.5)
SODIUM SERPL-SCNC: 139 MMOL/L — SIGNIFICANT CHANGE UP (ref 135–145)
WBC # BLD: 6.58 K/UL — SIGNIFICANT CHANGE UP (ref 3.8–10.5)
WBC # BLD: 7.41 K/UL — SIGNIFICANT CHANGE UP (ref 3.8–10.5)
WBC # FLD AUTO: 6.58 K/UL — SIGNIFICANT CHANGE UP (ref 3.8–10.5)
WBC # FLD AUTO: 7.41 K/UL — SIGNIFICANT CHANGE UP (ref 3.8–10.5)

## 2023-02-24 PROCEDURE — 80053 COMPREHEN METABOLIC PANEL: CPT

## 2023-02-24 PROCEDURE — 97535 SELF CARE MNGMENT TRAINING: CPT

## 2023-02-24 PROCEDURE — 97530 THERAPEUTIC ACTIVITIES: CPT

## 2023-02-24 PROCEDURE — C1776: CPT

## 2023-02-24 PROCEDURE — 99239 HOSP IP/OBS DSCHRG MGMT >30: CPT

## 2023-02-24 PROCEDURE — 36415 COLL VENOUS BLD VENIPUNCTURE: CPT

## 2023-02-24 PROCEDURE — 97165 OT EVAL LOW COMPLEX 30 MIN: CPT

## 2023-02-24 PROCEDURE — C1889: CPT

## 2023-02-24 PROCEDURE — 97116 GAIT TRAINING THERAPY: CPT

## 2023-02-24 PROCEDURE — 85027 COMPLETE CBC AUTOMATED: CPT

## 2023-02-24 PROCEDURE — 97161 PT EVAL LOW COMPLEX 20 MIN: CPT

## 2023-02-24 PROCEDURE — 80048 BASIC METABOLIC PNL TOTAL CA: CPT

## 2023-02-24 PROCEDURE — C1713: CPT

## 2023-02-24 PROCEDURE — 76000 FLUOROSCOPY <1 HR PHYS/QHP: CPT

## 2023-02-24 RX ORDER — FAMOTIDINE 10 MG/ML
1 INJECTION INTRAVENOUS
Qty: 60 | Refills: 0
Start: 2023-02-24 | End: 2023-03-25

## 2023-02-24 RX ORDER — OXYCODONE HYDROCHLORIDE 5 MG/1
1 TABLET ORAL
Qty: 25 | Refills: 0
Start: 2023-02-24

## 2023-02-24 RX ORDER — ACETAMINOPHEN 500 MG
2 TABLET ORAL
Qty: 0 | Refills: 0 | DISCHARGE
Start: 2023-02-24

## 2023-02-24 RX ORDER — OMEPRAZOLE 10 MG/1
1 CAPSULE, DELAYED RELEASE ORAL
Qty: 30 | Refills: 1
Start: 2023-02-24 | End: 2023-04-24

## 2023-02-24 RX ORDER — POLYETHYLENE GLYCOL 3350 17 G/17G
17 POWDER, FOR SOLUTION ORAL
Qty: 0 | Refills: 0 | DISCHARGE
Start: 2023-02-24

## 2023-02-24 RX ORDER — ACETAMINOPHEN 500 MG
2 TABLET ORAL
Qty: 0 | Refills: 0 | DISCHARGE

## 2023-02-24 RX ADMIN — APIXABAN 2.5 MILLIGRAM(S): 2.5 TABLET, FILM COATED ORAL at 08:33

## 2023-02-24 RX ADMIN — Medication 1000 MILLIGRAM(S): at 05:43

## 2023-02-24 RX ADMIN — SODIUM CHLORIDE 125 MILLILITER(S): 9 INJECTION, SOLUTION INTRAVENOUS at 05:42

## 2023-02-24 RX ADMIN — CELECOXIB 100 MILLIGRAM(S): 200 CAPSULE ORAL at 08:34

## 2023-02-24 RX ADMIN — PANTOPRAZOLE SODIUM 40 MILLIGRAM(S): 20 TABLET, DELAYED RELEASE ORAL at 05:43

## 2023-02-24 RX ADMIN — Medication 1000 MILLIGRAM(S): at 05:51

## 2023-02-24 RX ADMIN — Medication 1000 MILLIGRAM(S): at 13:54

## 2023-02-24 RX ADMIN — Medication 1000 MILLIGRAM(S): at 14:03

## 2023-02-24 RX ADMIN — CELECOXIB 100 MILLIGRAM(S): 200 CAPSULE ORAL at 08:35

## 2023-02-24 RX ADMIN — Medication 101.6 MILLIGRAM(S): at 05:43

## 2023-02-24 NOTE — DISCHARGE NOTE NURSING/CASE MANAGEMENT/SOCIAL WORK - PATIENT PORTAL LINK FT
You can access the FollowMyHealth Patient Portal offered by [facility] by registering at the following website: [email or web address]. By joining [facility]’s FollowMyHealth portal, you will also be able to view your health information using other applications (apps) compatible with our system.

## 2023-02-24 NOTE — CARE COORDINATION ASSESSMENT. - NSPASTMEDSURGHISTORY_GEN_ALL_CORE_FT
PAST MEDICAL & SURGICAL HISTORY:  Knee torn cartilage, left      Mumps      Scarlet fever      S/P ORIF (open reduction internal fixation) fracture      S/P arthroscopic knee surgery      Right fibular fracture      HLD (hyperlipidemia)

## 2023-02-24 NOTE — PROGRESS NOTE ADULT - SUBJECTIVE AND OBJECTIVE BOX
Procedure:  Left Anterior THR  POD#: 1    S: Pt without complaints. No SOB,CP, N/V. Tolerated Fluids / Diet well.   Pain comfortable on Interval Rx  + Tylenol + Celebrex. No BM yet  + flatus, No abdominal pain.  + voiding  Pain Rx:   acetaminophen     Tablet .. 1000 milliGRAM(s) Oral every 8 hours  celecoxib 100 milliGRAM(s) Oral every 12 hours  HYDROmorphone  Injectable 0.5 milliGRAM(s) IV Push every 3 hours PRN  melatonin 3 milliGRAM(s) Oral at bedtime PRN  methocarbamol 500 milliGRAM(s) Oral every 12 hours PRN  ondansetron Injectable 4 milliGRAM(s) IV Push every 8 hours PRN  ondansetron Injectable 4 milliGRAM(s) IV Push every 6 hours PRN  oxyCODONE    IR 5 milliGRAM(s) Oral every 3 hours PRN  oxyCODONE    IR 10 milliGRAM(s) Oral every 3 hours PRN    O: General: Pt Alert and oriented, On exam NAD,   VS: Vital Signs Last 24 Hrs  T(C): 37.8 (24 Feb 2023 03:21), Max: 37.8 (24 Feb 2023 03:21)  T(F): 100.1 (24 Feb 2023 03:21), Max: 100.1 (24 Feb 2023 03:21)  HR: 98 (24 Feb 2023 03:21) (82 - 101)  BP: 139/82 (24 Feb 2023 03:21) (105/62 - 146/87)  BP(mean): --  RR: 18 (24 Feb 2023 03:21) (14 - 20)  SpO2: 98% (24 Feb 2023 03:21) (93% - 99%)    Parameters below as of 23 Feb 2023 15:55  Patient On (Oxygen Delivery Method): room air      Heart: RRR  Lungs: BS clear bilat.  Abdomen: Soft; no distention, benign exam    Ext: Left Ant. Hip: mepilex dry and in place  Neurologic: Has sensation bilat. feet & toes ;  Full AROM bilat feet & toes. EHL / AT  = Bilat: 5/5 ; Sensation Present mid lateral thigh  Vascular: Feet toes warm, pink. DP = 2+. Calves soft ; w/o tenderness bilat..  VTEP: On Bilat. Venodynes +   apixaban 2.5 milliGRAM(s) Oral every 12 hours    H.O Prophylaxis: Celebrex 100 q 12 for 3 days (recent GI bleeding due to ulcers in Oct 2022)  Activity in PT : Walked 100'                          8.4    6.58  )-----------( 143      ( 24 Feb 2023 06:30 )             26.6     02-24    139  |  104  |  18  ----------------------------<  116<H>  3.7   |  28  |  0.97    Ca    7.9<L>      24 Feb 2023 06:30    TPro  6.9  /  Alb  3.5  /  TBili  1.1  /  DBili  x   /  AST  23  /  ALT  24  /  AlkPhos  91  02-23      Hospitalist input noted    Primary Orthopedic Assessment:  • Stable from Orthopedic perspective  • Neuro motor exam stable:   • Labs: post op anemia well tolerated      Plan:   • Continue:  PT/OT/Weightbearing as tolerated with assistance of a walker/Anterior THR precautions/Ice to hip/          Incentive spirometry encouraged   • Continue DVT prophylaxis as prescribed, including use of compression devices and ankle pumps  • Continue Pain Rx  • Anterior hip precautions reviewed with patient  • Plans per Medicine   • Discharge planning – anticipated discharge is Home D/C with home care & home PT  when medically stable & cleared by PT/OT--today    Opioid safety discussed w/ pt.  Do not keep opioid in the medicine cabinet in bathroom where others may have access to it.  Do not drive while taking opioid.  To dispose of it some pharmacies do have drop boxes as do police stations.  Do not flush or put in trash.

## 2023-02-24 NOTE — PATIENT CHOICE NOTE. - NSPTCHOICESTATE_GEN_ALL_CORE

## 2023-02-24 NOTE — CARE COORDINATION ASSESSMENT. - OTHER PERTINENT REFERRAL INFORMATION
Pt lives with his wife who is bedbound s/p stroke 2 yrs ago. The pt's wife has a 24x7 private aide. His dtr Magdaleno, who is a SW is his caregiver. In addition, his son lives locally and assists as well. List of home care agencies offered to pt, who declined list and requested [facility].

## 2023-02-24 NOTE — PROGRESS NOTE ADULT - REASON FOR ADMISSION
Left hip pain- femoral neck fracture
left hip femoral neck fx--for left ant KARTHIKEYAN
left hip fx--for left KARTHIKEYAN

## 2023-02-24 NOTE — CARE COORDINATION ASSESSMENT. - NSCAREPROVIDERS_GEN_ALL_CORE_FT
CARE PROVIDERS:  Administration: Mona Benítez  Admitting: Cleveland Redmond  Attending: Cleveland Redmond  Case Management: Lissa Bravo  Consultant: Antony Blair  Nurse: Luanne Gonzalez  Nurse: Gus Olivo  Nurse: Steph Bajwa  Occupational Therapy: Mirella Byrnes  Physical Therapy: Joyce Coulter  Physical Therapy: Jevon Knight  Primary Team: Halley Spencer  Primary Team: Radha Kramer  Primary Team: Lissa Zuniga  Primary Team: Michelle Cruz  Primary Team: Alex Carlin  Primary Team: James Watson  Registered Dietitian: Rena Lee  Research: James Gay  : Vivi Vasquez  Team: MERA  Hospitalists, Team

## 2023-02-24 NOTE — CASE MANAGEMENT PROGRESS NOTE - NSCMPROGRESSNOTE_GEN_ALL_CORE
Pt may be cleared medically for home today pending a 12 Noon H&H. Pt is for home with home care s/p L anterior THR. PT/OT recommended home care. The pt is in agreement with the transition home plan. List of choices were given and the pt chose [facility] care. 485 sent with a SOC on 2/25/23. Script was sent to [facility] for a RW and commode to be delivered to the bedside by Dc. The bedside RN is aware of the plan. The pt is the caregiver to his wife at home and the pt's daughter will transport him home and assist him in the home with his recovery. 
ns

## 2023-02-24 NOTE — DISCHARGE NOTE NURSING/CASE MANAGEMENT/SOCIAL WORK - NSSCNAMETXT_GEN_ALL_CORE
[facility] - [phone] or [phone]   Physical therapist to follow. Please contact the home care agency if you have not heard from them by 12 noon on the day after your hospital discharge.

## 2023-02-26 NOTE — CARE COORDINATION ASSESSMENT. - CURRENT MENTAL STATUS/COGNITIVE FUNCTIONING
alert/oriented to person/oriented to place/oriented to time/oriented to situation/recent memory is intact
Scrotal laceration

## 2023-02-27 ENCOUNTER — TRANSCRIPTION ENCOUNTER (OUTPATIENT)
Age: 73
End: 2023-02-27

## 2023-03-01 PROBLEM — Z00.00 ENCOUNTER FOR PREVENTIVE HEALTH EXAMINATION: Status: ACTIVE | Noted: 2023-03-01

## 2023-03-10 ENCOUNTER — APPOINTMENT (OUTPATIENT)
Dept: ORTHOPEDIC SURGERY | Facility: CLINIC | Age: 73
End: 2023-03-10
Payer: MEDICARE

## 2023-03-10 ENCOUNTER — NON-APPOINTMENT (OUTPATIENT)
Age: 73
End: 2023-03-10

## 2023-03-10 VITALS — SYSTOLIC BLOOD PRESSURE: 145 MMHG | DIASTOLIC BLOOD PRESSURE: 92 MMHG | HEART RATE: 97 BPM

## 2023-03-10 DIAGNOSIS — Z96.642 PRESENCE OF LEFT ARTIFICIAL HIP JOINT: ICD-10-CM

## 2023-03-10 PROCEDURE — 73502 X-RAY EXAM HIP UNI 2-3 VIEWS: CPT | Mod: LT

## 2023-03-10 PROCEDURE — 99024 POSTOP FOLLOW-UP VISIT: CPT

## 2023-03-10 RX ORDER — AMOXICILLIN 500 MG/1
500 CAPSULE ORAL
Qty: 20 | Refills: 0 | Status: ACTIVE | COMMUNITY
Start: 2023-03-10 | End: 1900-01-01

## 2023-04-12 NOTE — PATIENT PROFILE ADULT - PATIENT'S GENDER IDENTITY
Called patient to schedule testing in [place] [facility], ordered by Dr. Bullock. Please transfer call to the [place] office.   Male

## 2023-06-02 ENCOUNTER — APPOINTMENT (OUTPATIENT)
Dept: ORTHOPEDIC SURGERY | Facility: CLINIC | Age: 73
End: 2023-06-02
Payer: MEDICARE

## 2023-06-02 VITALS — HEART RATE: 68 BPM | DIASTOLIC BLOOD PRESSURE: 83 MMHG | SYSTOLIC BLOOD PRESSURE: 133 MMHG

## 2023-06-02 PROCEDURE — 73502 X-RAY EXAM HIP UNI 2-3 VIEWS: CPT | Mod: LT

## 2023-06-02 PROCEDURE — 99214 OFFICE O/P EST MOD 30 MIN: CPT

## 2023-06-02 NOTE — PHYSICAL EXAM
[de-identified] : General Appearance / Station: Well developed, well nourished, in no acute distress\par Orientation: Oriented to person, place, and time\par Gait & Station: Ambulates with assistive device\par Neurologic: Normal leg sensation\par Cardiovascular: Warm extremity\par Lymphatics: No lymphedema\par \par OPERATIVE HIP\par Skin: incision clean, dry and intact. Well healed  No erythema, warmth or tenderness.\par Range of motion: Painless internal and external rotation of the hip.\par Strength: Within Normal Limits. Negative Trendelenburg sign                                                                     \par Neurovascular Exam: Able to wiggle toes and dorsiflex/ plantarflex ankle. Foot warm, well perfused\par     \par \par \par  [de-identified] : Imaging: AP Pelvis and lateral views of the left hip show satisfactory placement of a left cementless total hip arthroplasty with prophylactic cable. There are no changes in alignment of hardware and no signs of radiographic failure compared to previous radiographs.\par

## 2023-06-02 NOTE — DISCUSSION/SUMMARY
[de-identified] : Now approximately 3.5 months since left KARTHIKEYAN for femoral neck fracture. Overall doing well.\par \par At this point we have suggested continued exercises and return to normal activity. Appropriate instructions regarding further care, restrictions, and further recovery has been given. Follow-up in three months\par \par I discussed with them that I often prescribe an anti-inflammatory that should be taken once a day with meals for intermittent pain as they continue to recover They should not take this while also taking Aleve (Naprosyn), Motrin/ Advil (Ibuprofen), Toradol (ketoralac). They must stop taking it if they develops stomach pain, increased bleeding or bruising and they should follow-up with their primary care doctor for routine blood work including kidney function to monitor its effect. While it Is not a habit-forming substance, it should only  be taken as needed. He would like to continue with no medication at this time. \par \par We have also counseled the patient to avoid any dental procedures for the first three months postoperatively. We remain available for any further questions and concerns and instructed patient that we rossana like to see them if any concerns for infection including fevers, redness, or increased swelling.\par

## 2023-06-02 NOTE — HISTORY OF PRESENT ILLNESS
[de-identified] : SILVESTRE RACHEL  is an 73 year-old male presents today status post left total hip arthroplasty for displaced left femoral neck fracture on 2/24/2023 for followup. The patient is living at home. The patient has maintained weight bearing as tolerated and hip precautions. The patient is ambulating with no assistive device and working with physical therapy. The patient has weaned  off all narcotic pain medicine. The patient is progressing well and is happy with the progress.\par \par No fever, chills, or signs of infection. Today, patient does not state any other associated signs or complaints outside of those described. The patient presents for postoperative followup.\par \par A complete review of symptoms as well as past medical/surgical history, medications, allergies, social and family history, and other details of HPI and exam were reviewed per first visit intake form and updated accordingly. Additional and more relevant details are noted in further detail today.\par

## 2023-06-12 NOTE — ED PROVIDER NOTE - NS_EDPROVIDERDISPOUSERTYPE_ED_A_ED
Ketoconazole Counseling:   Patient counseled regarding improving absorption with orange juice.  Adverse effects include but are not limited to breast enlargement, headache, diarrhea, nausea, upset stomach, liver function test abnormalities, taste disturbance, and stomach pain.  There is a rare possibility of liver failure that can occur when taking ketoconazole. The patient understands that monitoring of LFTs may be required, especially at baseline. The patient verbalized understanding of the proper use and possible adverse effects of ketoconazole.  All of the patient's questions and concerns were addressed. Attending Attestation (For Attendings USE Only)...

## 2024-04-26 NOTE — PATIENT PROFILE ADULT - PATIENT REPRESENTATIVE: ( YOU CAN CHOOSE ANY PERSON THAT CAN ASSIST YOU WITH YOUR HEALTH CARE PREFERENCES, DOES NOT HAVE TO BE A SPOUSE, IMMEDIATE FAMILY OR SIGNIFICANT OTHER/PARTNER)
Health Maintenance       Pneumococcal Vaccine 0-64 (2 of 2 - PCV)  Order placed this encounter    COVID-19 Vaccine (4 - 2023-24 season)  Overdue since 9/1/2023           Following review of the above:  Pended orders- prevnar 20  Patient is not proceeding with: COVID-19    Note: Refer to final orders and clinician documentation.      Recent PHQ 2/9 Score    PHQ 2:  PHQ 2 Score Adult PHQ 2 Score Adult PHQ 2 Interpretation Little interest or pleasure in activity?   4/26/2024  10:28 AM 0 No further screening needed 0       PHQ 9:  PHQ 9 Score Adult PHQ 9 Score Adult PHQ 9 Interpretation   4/26/2024  10:28 AM 1 Minimal Depression     PHQ-2/9 Depression Screening  Little interest or pleasure in activity?: Not at all  Feeling down, depressed or hopeless?: Not at all  Initial depression screening score:: 0  PHQ2 Interpretation: No further screening needed  Trouble falling or staying asleep or sleeping all the time?: Not at all  Feeling tired or having little energy?: Not at all  Poor appetite or overeating?: Several days  Feeling bad about yourself or that you are a failure or have let yourself or family down?: Not at all  Trouble concentrating on things such as reading the newspaper or watching TV?: Not at all  Moving or speaking slowly that other people have noticed or the opposite - being so fidgety or restless that you have been moving around a lot more than usual?: Not at all  Thoughts that you would be better off dead or of hurting yourself in some way?: Not at all  Total depressive symptoms score (PHQ9): : 1  PHQ9 Interpretation: Minimal Depression  If you reported any problems, how difficult have these problems made it to do your work, take care of things at home, or get along with other people?: Not difficult at all   Last four GAD7 Assessments           4/26/2024    10:45 AM   GAD7 Screening   GAD7 Score 0   Feeling nervous, anxious or on edge Not at all   Not being able to stop or control worrying Not at all    Worrying too much about different things Not at all   Trouble relaxing Not at all   Being so restless that it's hard to sit still Not at all   Becoming easily annoyed or irritable Not at all   Feeling afraid as if something awful might happen Not at all   Ability to handle work, home and other people Not difficult at all        declines

## 2024-07-30 NOTE — ED ADULT NURSE NOTE - NURSING MUSC STRENGTH
hand grasp, leg strength strong and equal bilaterally Please see my personal attestation on the resident physician Internal medicine progress note.

## 2025-06-09 RX ORDER — SIMVASTATIN 20 MG/1
20 TABLET, FILM COATED ORAL DAILY
Qty: 90 | Refills: 0 | Status: ACTIVE | COMMUNITY

## 2025-06-09 RX ORDER — EZETIMIBE 10 MG/1
10 TABLET ORAL
Qty: 60 | Refills: 0 | Status: ACTIVE | COMMUNITY

## 2025-06-10 ENCOUNTER — OUTPATIENT (OUTPATIENT)
Dept: OUTPATIENT SERVICES | Facility: HOSPITAL | Age: 75
LOS: 1 days | End: 2025-06-10
Payer: MEDICARE

## 2025-06-10 DIAGNOSIS — Z98.89 OTHER SPECIFIED POSTPROCEDURAL STATES: Chronic | ICD-10-CM

## 2025-06-10 DIAGNOSIS — I35.0 NONRHEUMATIC AORTIC (VALVE) STENOSIS: ICD-10-CM

## 2025-06-11 ENCOUNTER — APPOINTMENT (OUTPATIENT)
Dept: CARDIOTHORACIC SURGERY | Facility: CLINIC | Age: 75
End: 2025-06-11
Payer: MEDICARE

## 2025-06-11 ENCOUNTER — RESULT REVIEW (OUTPATIENT)
Age: 75
End: 2025-06-11

## 2025-06-11 VITALS
TEMPERATURE: 98.3 F | OXYGEN SATURATION: 99 % | DIASTOLIC BLOOD PRESSURE: 105 MMHG | RESPIRATION RATE: 16 BRPM | HEART RATE: 82 BPM | SYSTOLIC BLOOD PRESSURE: 168 MMHG

## 2025-06-11 VITALS — WEIGHT: 225 LBS

## 2025-06-11 VITALS — HEIGHT: 71 IN

## 2025-06-11 PROCEDURE — 93356 MYOCRD STRAIN IMG SPCKL TRCK: CPT

## 2025-06-11 PROCEDURE — 93306 TTE W/DOPPLER COMPLETE: CPT

## 2025-06-11 PROCEDURE — 99204 OFFICE O/P NEW MOD 45 MIN: CPT

## 2025-06-11 PROCEDURE — 93306 TTE W/DOPPLER COMPLETE: CPT | Mod: 26

## (undated) DEVICE — CATH IV SAFE BC 22G X 1" (BLUE)

## (undated) DEVICE — SOL IRR BAG H2O 1000ML

## (undated) DEVICE — SOL BETADINE POUCH 0.75OZ STERILE

## (undated) DEVICE — Device

## (undated) DEVICE — BRUSH CYTO ENDO

## (undated) DEVICE — SUCTION YANKAUER TAPERED BULBOUS NO VENT

## (undated) DEVICE — SYR LUER LOK 50CC

## (undated) DEVICE — SYR LUER LOK 30CC

## (undated) DEVICE — DRSG DERMABOND PRINEO 60CM

## (undated) DEVICE — BRUSH COLONOSCOPY CYTOLOGY

## (undated) DEVICE — DRSG SILVERLON ISLAND 4X6" 2X4" PAD

## (undated) DEVICE — POSITIONER FOAM ABDUCTION PILLOW MED (PINK)

## (undated) DEVICE — VENODYNE/SCD SLEEVE CALF MEDIUM

## (undated) DEVICE — GLV 7.5 PROTEXIS (WHITE)

## (undated) DEVICE — CONTAINER SPECIMEN 4OZ

## (undated) DEVICE — NDL SPINAL 18G X 3.5" (PINK)

## (undated) DEVICE — SOL IRR POUR NS 0.9% 500ML

## (undated) DEVICE — CATH IV SAFE BC 20G X 1.16" (PINK)

## (undated) DEVICE — SOL IRR POUR H2O 500ML

## (undated) DEVICE — GLV 8 PROTEXIS (WHITE)

## (undated) DEVICE — SOL IRR BAG NS 0.9% 3000ML

## (undated) DEVICE — MARKER ENDO SPOT EX

## (undated) DEVICE — CANISTER SUCTION LID GUARD 3000CC

## (undated) DEVICE — HANDPIECE INTERPULSE W MULTI TIP

## (undated) DEVICE — DRAPE 3/4 SHEET 52X76"

## (undated) DEVICE — DRILL BIT MICROAIRE TWIST 2.4MM X 127MM

## (undated) DEVICE — DRAPE XL SHEET 77X98"

## (undated) DEVICE — FORMALIN CUPS 10% BUFFERED

## (undated) DEVICE — POSITIONER FOAM HEAD CRADLE (PINK)

## (undated) DEVICE — TUBING SUCTION CONN 6FT STERILE

## (undated) DEVICE — DRSG CURITY GAUZE SPONGE 4 X 4" 12-PLY

## (undated) DEVICE — DRAPE C ARM 41X140"

## (undated) DEVICE — SOLIDIFIER CANN EXPRESS 3K

## (undated) DEVICE — TUBING IV SET GRAVITY 3Y 100" MACRO

## (undated) DEVICE — SENSOR O2 FINGER XL ADULT 24/BX 6BX/CA

## (undated) DEVICE — NDL INJ SCLERO INTERJECT 23G

## (undated) DEVICE — SUT VICRYL PLUS 1 27" CP UNDYED

## (undated) DEVICE — PACK TOTAL HIP

## (undated) DEVICE — DRAPE TOP SHEET 53" X 101"

## (undated) DEVICE — DRAPE SHOWER CURTAIN ISOLATION

## (undated) DEVICE — SYR IV POSIFLUSH NS 3ML 30/TY

## (undated) DEVICE — ELCTR AQUAMANTYS BIPOLAR SEALER 6.0

## (undated) DEVICE — DRSG SILVERLON ISLAND 4X10" 2X8" PAD

## (undated) DEVICE — ELCTR ROCKER SWITCH PENCIL BLUE 10FT

## (undated) DEVICE — HOOD FLYTE STRYKER HELMET SHIELD

## (undated) DEVICE — POSITIONER STRAP ARMBOARD VELCRO TS-30

## (undated) DEVICE — TUBING ENDO EXT OLYMPUS 160 24HR USE

## (undated) DEVICE — GOWN XL W TOWEL

## (undated) DEVICE — DRAPE UTILITY W TAPE 15X26"

## (undated) DEVICE — SOL IRR NS 0.9% 250ML

## (undated) DEVICE — TUBING IV SET SECOND 34" W/O LOK-BLUNT

## (undated) DEVICE — BAG SPONGE COUNTER EZ

## (undated) DEVICE — STERIS DEFENDO 3-PIECE KIT (AIR/WATER, SUCTION & BIOPSY VALVES)

## (undated) DEVICE — DRSG COMBINE 5X9"

## (undated) DEVICE — SNARE LRG

## (undated) DEVICE — POSITIONER POSITIONING ROLL  5X17"

## (undated) DEVICE — POSITIONER PATIENT SAFETY STRAP 3X60"

## (undated) DEVICE — CATH ELECHMSTAT  INJ 7FR 210CM

## (undated) DEVICE — ELCTR GROUNDING PAD ADULT COVIDIEN

## (undated) DEVICE — SPECIMEN CONTAINER PET

## (undated) DEVICE — FORCEP RADIAL JAW 4 W NDL 2.2MM 2.8MM 160CM YELLOW DISP

## (undated) DEVICE — TUBING CANNULA SALTER LABS NASAL ADULT 7FT

## (undated) DEVICE — POSITIONER STIRRUP STRAP W SLIP RING 19X3.5"

## (undated) DEVICE — PLASTIC SOLUTION BOWL 160Z

## (undated) DEVICE — BITE BLOCK MAXI RUBBER STAMP

## (undated) DEVICE — SOL IRR POUR H2O 1500ML

## (undated) DEVICE — SOL IRR POUR H2O 1000ML

## (undated) DEVICE — TUBE O2 SUPL CRUSH RESIS CONN SOUTHSIDE ONLY

## (undated) DEVICE — WARMING BLANKET UPPER ADULT

## (undated) DEVICE — SAW BLADE STRYKER SAGITTAL AGGRESSIVE 25X86.5X1.32MM

## (undated) DEVICE — SET IV PUMP BLOOD 1VALVE 180FILTER NON-DEHP

## (undated) DEVICE — CATH ELCTR GLIDE PRB 7FR

## (undated) DEVICE — SOL IRR POUR NS 0.9% 1000ML